# Patient Record
Sex: MALE | Race: WHITE | Employment: OTHER | URBAN - METROPOLITAN AREA
[De-identification: names, ages, dates, MRNs, and addresses within clinical notes are randomized per-mention and may not be internally consistent; named-entity substitution may affect disease eponyms.]

---

## 2019-01-01 ENCOUNTER — APPOINTMENT (OUTPATIENT)
Dept: CT IMAGING | Age: 84
DRG: 871 | End: 2019-01-01
Payer: MEDICARE

## 2019-01-01 ENCOUNTER — APPOINTMENT (OUTPATIENT)
Dept: GENERAL RADIOLOGY | Age: 84
DRG: 061 | End: 2019-01-01
Payer: MEDICARE

## 2019-01-01 ENCOUNTER — APPOINTMENT (OUTPATIENT)
Dept: ULTRASOUND IMAGING | Age: 84
DRG: 061 | End: 2019-01-01
Payer: MEDICARE

## 2019-01-01 ENCOUNTER — APPOINTMENT (OUTPATIENT)
Dept: GENERAL RADIOLOGY | Age: 84
DRG: 871 | End: 2019-01-01
Payer: MEDICARE

## 2019-01-01 ENCOUNTER — HOSPITAL ENCOUNTER (INPATIENT)
Age: 84
LOS: 2 days | Discharge: HOSPICE | DRG: 061 | End: 2019-07-17
Attending: EMERGENCY MEDICINE | Admitting: FAMILY MEDICINE
Payer: MEDICARE

## 2019-01-01 ENCOUNTER — TELEPHONE (OUTPATIENT)
Dept: CARDIOLOGY CLINIC | Age: 84
End: 2019-01-01

## 2019-01-01 ENCOUNTER — HOSPITAL ENCOUNTER (INPATIENT)
Age: 84
LOS: 8 days | Discharge: NURSING, REHAB OR LONG-TERM CARE | DRG: 871 | End: 2019-07-05
Attending: EMERGENCY MEDICINE | Admitting: INTERNAL MEDICINE
Payer: MEDICARE

## 2019-01-01 ENCOUNTER — APPOINTMENT (OUTPATIENT)
Dept: CT IMAGING | Age: 84
DRG: 061 | End: 2019-01-01
Payer: MEDICARE

## 2019-01-01 VITALS
HEART RATE: 60 BPM | HEIGHT: 72 IN | BODY MASS INDEX: 24.02 KG/M2 | DIASTOLIC BLOOD PRESSURE: 54 MMHG | SYSTOLIC BLOOD PRESSURE: 104 MMHG | TEMPERATURE: 97.4 F | WEIGHT: 177.31 LBS | OXYGEN SATURATION: 97 % | RESPIRATION RATE: 18 BRPM

## 2019-01-01 VITALS
TEMPERATURE: 98.4 F | OXYGEN SATURATION: 100 % | SYSTOLIC BLOOD PRESSURE: 105 MMHG | BODY MASS INDEX: 23.46 KG/M2 | HEART RATE: 77 BPM | DIASTOLIC BLOOD PRESSURE: 52 MMHG | RESPIRATION RATE: 20 BRPM | HEIGHT: 72 IN | WEIGHT: 173.2 LBS

## 2019-01-01 DIAGNOSIS — A41.9 SEPSIS, DUE TO UNSPECIFIED ORGANISM: Primary | ICD-10-CM

## 2019-01-01 DIAGNOSIS — I63.9 ISCHEMIC STROKE (HCC): Primary | ICD-10-CM

## 2019-01-01 DIAGNOSIS — N28.9 RENAL INSUFFICIENCY: ICD-10-CM

## 2019-01-01 DIAGNOSIS — J18.9 PNEUMONIA OF RIGHT LOWER LOBE DUE TO INFECTIOUS ORGANISM: ICD-10-CM

## 2019-01-01 DIAGNOSIS — I95.9 HYPOTENSION, UNSPECIFIED HYPOTENSION TYPE: ICD-10-CM

## 2019-01-01 DIAGNOSIS — Z51.5 PALLIATIVE CARE ENCOUNTER: ICD-10-CM

## 2019-01-01 DIAGNOSIS — R77.8 ELEVATED TROPONIN: ICD-10-CM

## 2019-01-01 DIAGNOSIS — E87.1 HYPONATREMIA: ICD-10-CM

## 2019-01-01 DIAGNOSIS — R53.1 GENERALIZED WEAKNESS: ICD-10-CM

## 2019-01-01 LAB
AADO2: 510.8 MMHG
ALBUMIN SERPL-MCNC: 2.9 G/DL (ref 3.5–5.2)
ALBUMIN SERPL-MCNC: 2.9 G/DL (ref 3.5–5.2)
ALBUMIN SERPL-MCNC: 3.3 G/DL (ref 3.5–5.2)
ALP BLD-CCNC: 165 U/L (ref 40–129)
ALP BLD-CCNC: 174 U/L (ref 40–129)
ALP BLD-CCNC: 194 U/L (ref 40–129)
ALT SERPL-CCNC: 28 U/L (ref 0–40)
ALT SERPL-CCNC: 32 U/L (ref 0–40)
ALT SERPL-CCNC: 39 U/L (ref 0–40)
ANION GAP SERPL CALCULATED.3IONS-SCNC: 10 MMOL/L (ref 7–16)
ANION GAP SERPL CALCULATED.3IONS-SCNC: 11 MMOL/L (ref 7–16)
ANION GAP SERPL CALCULATED.3IONS-SCNC: 12 MMOL/L (ref 7–16)
ANION GAP SERPL CALCULATED.3IONS-SCNC: 13 MMOL/L (ref 7–16)
ANION GAP SERPL CALCULATED.3IONS-SCNC: 14 MMOL/L (ref 7–16)
ANION GAP SERPL CALCULATED.3IONS-SCNC: 14 MMOL/L (ref 7–16)
ANION GAP SERPL CALCULATED.3IONS-SCNC: 18 MMOL/L (ref 7–16)
ANION GAP SERPL CALCULATED.3IONS-SCNC: 9 MMOL/L (ref 7–16)
ANION GAP SERPL CALCULATED.3IONS-SCNC: 9 MMOL/L (ref 7–16)
ANISOCYTOSIS: ABNORMAL
APTT: 27.6 SEC (ref 24.5–35.1)
APTT: 27.6 SEC (ref 24.5–35.1)
AST SERPL-CCNC: 36 U/L (ref 0–39)
AST SERPL-CCNC: 43 U/L (ref 0–39)
AST SERPL-CCNC: 56 U/L (ref 0–39)
B.E.: -6.8 MMOL/L (ref -3–3)
BACTERIA: NORMAL /HPF
BACTERIA: NORMAL /HPF
BASOPHILS ABSOLUTE: 0 E9/L (ref 0–0.2)
BASOPHILS ABSOLUTE: 0.02 E9/L (ref 0–0.2)
BASOPHILS ABSOLUTE: 0.05 E9/L (ref 0–0.2)
BASOPHILS ABSOLUTE: 0.08 E9/L (ref 0–0.2)
BASOPHILS ABSOLUTE: 0.09 E9/L (ref 0–0.2)
BASOPHILS RELATIVE PERCENT: 0 % (ref 0–2)
BASOPHILS RELATIVE PERCENT: 0 % (ref 0–2)
BASOPHILS RELATIVE PERCENT: 0.1 % (ref 0–2)
BASOPHILS RELATIVE PERCENT: 0.1 % (ref 0–2)
BASOPHILS RELATIVE PERCENT: 0.2 % (ref 0–2)
BASOPHILS RELATIVE PERCENT: 0.9 % (ref 0–2)
BASOPHILS RELATIVE PERCENT: 1 % (ref 0–2)
BASOPHILS RELATIVE PERCENT: 1.1 % (ref 0–2)
BILIRUB SERPL-MCNC: 0.7 MG/DL (ref 0–1.2)
BILIRUB SERPL-MCNC: 1.1 MG/DL (ref 0–1.2)
BILIRUB SERPL-MCNC: 1.1 MG/DL (ref 0–1.2)
BILIRUBIN URINE: NEGATIVE
BILIRUBIN URINE: NEGATIVE
BLOOD CULTURE, ROUTINE: NORMAL
BLOOD, URINE: ABNORMAL
BLOOD, URINE: ABNORMAL
BUN BLDV-MCNC: 23 MG/DL (ref 8–23)
BUN BLDV-MCNC: 24 MG/DL (ref 8–23)
BUN BLDV-MCNC: 24 MG/DL (ref 8–23)
BUN BLDV-MCNC: 25 MG/DL (ref 8–23)
BUN BLDV-MCNC: 31 MG/DL (ref 8–23)
BUN BLDV-MCNC: 36 MG/DL (ref 8–23)
BUN BLDV-MCNC: 38 MG/DL (ref 8–23)
BUN BLDV-MCNC: 39 MG/DL (ref 8–23)
BUN BLDV-MCNC: 41 MG/DL (ref 8–23)
BUN BLDV-MCNC: 43 MG/DL (ref 8–23)
BUN BLDV-MCNC: 45 MG/DL (ref 8–23)
BURR CELLS: ABNORMAL
CALCIUM SERPL-MCNC: 8.4 MG/DL (ref 8.6–10.2)
CALCIUM SERPL-MCNC: 8.5 MG/DL (ref 8.6–10.2)
CALCIUM SERPL-MCNC: 8.6 MG/DL (ref 8.6–10.2)
CALCIUM SERPL-MCNC: 8.7 MG/DL (ref 8.6–10.2)
CALCIUM SERPL-MCNC: 8.7 MG/DL (ref 8.6–10.2)
CALCIUM SERPL-MCNC: 8.8 MG/DL (ref 8.6–10.2)
CALCIUM SERPL-MCNC: 8.8 MG/DL (ref 8.6–10.2)
CALCIUM SERPL-MCNC: 9 MG/DL (ref 8.6–10.2)
CALCIUM SERPL-MCNC: 9.1 MG/DL (ref 8.6–10.2)
CALCIUM SERPL-MCNC: 9.3 MG/DL (ref 8.6–10.2)
CALCIUM SERPL-MCNC: 9.6 MG/DL (ref 8.6–10.2)
CHLORIDE BLD-SCNC: 100 MMOL/L (ref 98–107)
CHLORIDE BLD-SCNC: 102 MMOL/L (ref 98–107)
CHLORIDE BLD-SCNC: 102 MMOL/L (ref 98–107)
CHLORIDE BLD-SCNC: 91 MMOL/L (ref 98–107)
CHLORIDE BLD-SCNC: 91 MMOL/L (ref 98–107)
CHLORIDE BLD-SCNC: 96 MMOL/L (ref 98–107)
CHLORIDE BLD-SCNC: 96 MMOL/L (ref 98–107)
CHLORIDE BLD-SCNC: 97 MMOL/L (ref 98–107)
CHLORIDE BLD-SCNC: 98 MMOL/L (ref 98–107)
CHLORIDE BLD-SCNC: 99 MMOL/L (ref 98–107)
CHLORIDE BLD-SCNC: 99 MMOL/L (ref 98–107)
CHLORIDE URINE RANDOM: 23 MMOL/L
CHOLESTEROL, TOTAL: 103 MG/DL (ref 0–199)
CHP ED QC CHECK: NORMAL
CHP ED QC CHECK: YES
CLARITY: CLEAR
CLARITY: CLEAR
CO2: 19 MMOL/L (ref 22–29)
CO2: 20 MMOL/L (ref 22–29)
CO2: 20 MMOL/L (ref 22–29)
CO2: 21 MMOL/L (ref 22–29)
CO2: 22 MMOL/L (ref 22–29)
CO2: 23 MMOL/L (ref 22–29)
CO2: 24 MMOL/L (ref 22–29)
COHB: 0.4 % (ref 0–1.5)
COLOR: YELLOW
COLOR: YELLOW
CORTISOL TOTAL: 47.8 MCG/DL (ref 2.68–18.4)
CREAT SERPL-MCNC: 1.1 MG/DL (ref 0.7–1.2)
CREAT SERPL-MCNC: 1.1 MG/DL (ref 0.7–1.2)
CREAT SERPL-MCNC: 1.2 MG/DL (ref 0.7–1.2)
CREAT SERPL-MCNC: 1.2 MG/DL (ref 0.7–1.2)
CREAT SERPL-MCNC: 1.3 MG/DL (ref 0.7–1.2)
CREAT SERPL-MCNC: 1.4 MG/DL (ref 0.7–1.2)
CREAT SERPL-MCNC: 1.4 MG/DL (ref 0.7–1.2)
CREAT SERPL-MCNC: 1.5 MG/DL (ref 0.7–1.2)
CREAT SERPL-MCNC: 1.5 MG/DL (ref 0.7–1.2)
CREAT SERPL-MCNC: 1.8 MG/DL (ref 0.7–1.2)
CREAT SERPL-MCNC: 2.1 MG/DL (ref 0.7–1.2)
CRITICAL: ABNORMAL
CULTURE, BLOOD 2: NORMAL
DATE ANALYZED: ABNORMAL
DATE OF COLLECTION: ABNORMAL
EKG ATRIAL RATE: 115 BPM
EKG ATRIAL RATE: 83 BPM
EKG ATRIAL RATE: 97 BPM
EKG P AXIS: 26 DEGREES
EKG P AXIS: 81 DEGREES
EKG P-R INTERVAL: 156 MS
EKG P-R INTERVAL: 178 MS
EKG Q-T INTERVAL: 390 MS
EKG Q-T INTERVAL: 402 MS
EKG Q-T INTERVAL: 406 MS
EKG QRS DURATION: 130 MS
EKG QRS DURATION: 156 MS
EKG QRS DURATION: 158 MS
EKG QTC CALCULATION (BAZETT): 477 MS
EKG QTC CALCULATION (BAZETT): 510 MS
EKG QTC CALCULATION (BAZETT): 539 MS
EKG R AXIS: -56 DEGREES
EKG R AXIS: -59 DEGREES
EKG R AXIS: -90 DEGREES
EKG T AXIS: 67 DEGREES
EKG T AXIS: 91 DEGREES
EKG T AXIS: 98 DEGREES
EKG VENTRICULAR RATE: 115 BPM
EKG VENTRICULAR RATE: 83 BPM
EKG VENTRICULAR RATE: 97 BPM
EOSINOPHILS ABSOLUTE: 0.13 E9/L (ref 0.05–0.5)
EOSINOPHILS ABSOLUTE: 0.3 E9/L (ref 0.05–0.5)
EOSINOPHILS ABSOLUTE: 0.35 E9/L (ref 0.05–0.5)
EOSINOPHILS ABSOLUTE: 0.54 E9/L (ref 0.05–0.5)
EOSINOPHILS ABSOLUTE: 1.39 E9/L (ref 0.05–0.5)
EOSINOPHILS ABSOLUTE: 1.95 E9/L (ref 0.05–0.5)
EOSINOPHILS ABSOLUTE: 2.05 E9/L (ref 0.05–0.5)
EOSINOPHILS ABSOLUTE: 2.09 E9/L (ref 0.05–0.5)
EOSINOPHILS ABSOLUTE: 2.33 E9/L (ref 0.05–0.5)
EOSINOPHILS ABSOLUTE: 2.88 E9/L (ref 0.05–0.5)
EOSINOPHILS RELATIVE PERCENT: 0.9 % (ref 0–6)
EOSINOPHILS RELATIVE PERCENT: 1.8 % (ref 0–6)
EOSINOPHILS RELATIVE PERCENT: 14 % (ref 0–6)
EOSINOPHILS RELATIVE PERCENT: 16 % (ref 0–6)
EOSINOPHILS RELATIVE PERCENT: 2.4 % (ref 0–6)
EOSINOPHILS RELATIVE PERCENT: 2.6 % (ref 0–6)
EOSINOPHILS RELATIVE PERCENT: 21.2 % (ref 0–6)
EOSINOPHILS RELATIVE PERCENT: 22 % (ref 0–6)
EOSINOPHILS RELATIVE PERCENT: 22.3 % (ref 0–6)
EOSINOPHILS RELATIVE PERCENT: 31 % (ref 0–6)
FIO2: 100 %
GFR AFRICAN AMERICAN: 36
GFR AFRICAN AMERICAN: 43
GFR AFRICAN AMERICAN: 53
GFR AFRICAN AMERICAN: 53
GFR AFRICAN AMERICAN: 58
GFR AFRICAN AMERICAN: 58
GFR AFRICAN AMERICAN: >60
GFR NON-AFRICAN AMERICAN: 30 ML/MIN/1.73
GFR NON-AFRICAN AMERICAN: 36 ML/MIN/1.73
GFR NON-AFRICAN AMERICAN: 44 ML/MIN/1.73
GFR NON-AFRICAN AMERICAN: 44 ML/MIN/1.73
GFR NON-AFRICAN AMERICAN: 48 ML/MIN/1.73
GFR NON-AFRICAN AMERICAN: 48 ML/MIN/1.73
GFR NON-AFRICAN AMERICAN: 52 ML/MIN/1.73
GFR NON-AFRICAN AMERICAN: 57 ML/MIN/1.73
GFR NON-AFRICAN AMERICAN: 57 ML/MIN/1.73
GFR NON-AFRICAN AMERICAN: >60 ML/MIN/1.73
GFR NON-AFRICAN AMERICAN: >60 ML/MIN/1.73
GLUCOSE BLD-MCNC: 100 MG/DL (ref 74–99)
GLUCOSE BLD-MCNC: 100 MG/DL (ref 74–99)
GLUCOSE BLD-MCNC: 103 MG/DL
GLUCOSE BLD-MCNC: 120 MG/DL (ref 74–99)
GLUCOSE BLD-MCNC: 125 MG/DL (ref 74–99)
GLUCOSE BLD-MCNC: 125 MG/DL (ref 74–99)
GLUCOSE BLD-MCNC: 137 MG/DL
GLUCOSE BLD-MCNC: 146 MG/DL (ref 74–99)
GLUCOSE BLD-MCNC: 146 MG/DL (ref 74–99)
GLUCOSE BLD-MCNC: 157 MG/DL (ref 74–99)
GLUCOSE BLD-MCNC: 91 MG/DL (ref 74–99)
GLUCOSE BLD-MCNC: 91 MG/DL (ref 74–99)
GLUCOSE BLD-MCNC: 97 MG/DL (ref 74–99)
GLUCOSE URINE: NEGATIVE MG/DL
GLUCOSE URINE: NEGATIVE MG/DL
HBA1C MFR BLD: 6 % (ref 4–5.6)
HCO3: 17.2 MMOL/L (ref 22–26)
HCT VFR BLD CALC: 30.6 % (ref 37–54)
HCT VFR BLD CALC: 31 % (ref 37–54)
HCT VFR BLD CALC: 33.5 % (ref 37–54)
HCT VFR BLD CALC: 34 % (ref 37–54)
HCT VFR BLD CALC: 34.3 % (ref 37–54)
HCT VFR BLD CALC: 34.3 % (ref 37–54)
HCT VFR BLD CALC: 34.5 % (ref 37–54)
HCT VFR BLD CALC: 35.6 % (ref 37–54)
HCT VFR BLD CALC: 36.4 % (ref 37–54)
HCT VFR BLD CALC: 38 % (ref 37–54)
HDLC SERPL-MCNC: 45 MG/DL
HEMOGLOBIN: 10.4 G/DL (ref 12.5–16.5)
HEMOGLOBIN: 10.4 G/DL (ref 12.5–16.5)
HEMOGLOBIN: 11.3 G/DL (ref 12.5–16.5)
HEMOGLOBIN: 11.4 G/DL (ref 12.5–16.5)
HEMOGLOBIN: 11.5 G/DL (ref 12.5–16.5)
HEMOGLOBIN: 11.6 G/DL (ref 12.5–16.5)
HEMOGLOBIN: 11.8 G/DL (ref 12.5–16.5)
HEMOGLOBIN: 11.9 G/DL (ref 12.5–16.5)
HEMOGLOBIN: 12.2 G/DL (ref 12.5–16.5)
HEMOGLOBIN: 12.9 G/DL (ref 12.5–16.5)
HHB: 1.2 % (ref 0–5)
IMMATURE GRANULOCYTES #: 0.06 E9/L
IMMATURE GRANULOCYTES #: 1.01 E9/L
IMMATURE GRANULOCYTES %: 0.6 % (ref 0–5)
IMMATURE GRANULOCYTES %: 4.5 % (ref 0–5)
INR BLD: 1.1
INR BLD: 1.1
KETONES, URINE: NEGATIVE MG/DL
KETONES, URINE: NEGATIVE MG/DL
L. PNEUMOPHILA SEROGP 1 UR AG: NORMAL
LAB: ABNORMAL
LACTIC ACID: 2 MMOL/L (ref 0.5–2.2)
LACTIC ACID: 5 MMOL/L (ref 0.5–2.2)
LACTIC ACID: 6.8 MMOL/L (ref 0.5–2.2)
LDL CHOLESTEROL CALCULATED: 36 MG/DL (ref 0–99)
LEUKOCYTE ESTERASE, URINE: NEGATIVE
LEUKOCYTE ESTERASE, URINE: NEGATIVE
LV EF: 48 %
LVEF MODALITY: NORMAL
LYMPHOCYTES ABSOLUTE: 0 E9/L (ref 1.5–4)
LYMPHOCYTES ABSOLUTE: 0.12 E9/L (ref 1.5–4)
LYMPHOCYTES ABSOLUTE: 0.12 E9/L (ref 1.5–4)
LYMPHOCYTES ABSOLUTE: 0.28 E9/L (ref 1.5–4)
LYMPHOCYTES ABSOLUTE: 0.78 E9/L (ref 1.5–4)
LYMPHOCYTES ABSOLUTE: 1.12 E9/L (ref 1.5–4)
LYMPHOCYTES ABSOLUTE: 2.33 E9/L (ref 1.5–4)
LYMPHOCYTES ABSOLUTE: 2.67 E9/L (ref 1.5–4)
LYMPHOCYTES RELATIVE PERCENT: 0.4 % (ref 20–42)
LYMPHOCYTES RELATIVE PERCENT: 0.4 % (ref 20–42)
LYMPHOCYTES RELATIVE PERCENT: 0.5 % (ref 20–42)
LYMPHOCYTES RELATIVE PERCENT: 0.5 % (ref 20–42)
LYMPHOCYTES RELATIVE PERCENT: 1 % (ref 20–42)
LYMPHOCYTES RELATIVE PERCENT: 12 % (ref 20–42)
LYMPHOCYTES RELATIVE PERCENT: 2.7 % (ref 20–42)
LYMPHOCYTES RELATIVE PERCENT: 22 % (ref 20–42)
LYMPHOCYTES RELATIVE PERCENT: 27.2 % (ref 20–42)
LYMPHOCYTES RELATIVE PERCENT: 7.9 % (ref 20–42)
Lab: ABNORMAL
MAGNESIUM: 1.5 MG/DL (ref 1.6–2.6)
MAGNESIUM: 1.6 MG/DL (ref 1.6–2.6)
MCH RBC QN AUTO: 30.9 PG (ref 26–35)
MCH RBC QN AUTO: 31 PG (ref 26–35)
MCH RBC QN AUTO: 31.2 PG (ref 26–35)
MCH RBC QN AUTO: 31.5 PG (ref 26–35)
MCH RBC QN AUTO: 31.6 PG (ref 26–35)
MCH RBC QN AUTO: 31.7 PG (ref 26–35)
MCH RBC QN AUTO: 31.8 PG (ref 26–35)
MCH RBC QN AUTO: 31.9 PG (ref 26–35)
MCHC RBC AUTO-ENTMCNC: 32.9 % (ref 32–34.5)
MCHC RBC AUTO-ENTMCNC: 33.2 % (ref 32–34.5)
MCHC RBC AUTO-ENTMCNC: 33.4 % (ref 32–34.5)
MCHC RBC AUTO-ENTMCNC: 33.5 % (ref 32–34.5)
MCHC RBC AUTO-ENTMCNC: 33.5 % (ref 32–34.5)
MCHC RBC AUTO-ENTMCNC: 33.9 % (ref 32–34.5)
MCHC RBC AUTO-ENTMCNC: 34 % (ref 32–34.5)
MCHC RBC AUTO-ENTMCNC: 34.1 % (ref 32–34.5)
MCHC RBC AUTO-ENTMCNC: 34.2 % (ref 32–34.5)
MCHC RBC AUTO-ENTMCNC: 34.3 % (ref 32–34.5)
MCV RBC AUTO: 92.3 FL (ref 80–99.9)
MCV RBC AUTO: 92.5 FL (ref 80–99.9)
MCV RBC AUTO: 92.7 FL (ref 80–99.9)
MCV RBC AUTO: 92.7 FL (ref 80–99.9)
MCV RBC AUTO: 93.2 FL (ref 80–99.9)
MCV RBC AUTO: 93.2 FL (ref 80–99.9)
MCV RBC AUTO: 93.7 FL (ref 80–99.9)
MCV RBC AUTO: 93.8 FL (ref 80–99.9)
MCV RBC AUTO: 94.8 FL (ref 80–99.9)
MCV RBC AUTO: 95 FL (ref 80–99.9)
METAMYELOCYTES RELATIVE PERCENT: 1 % (ref 0–1)
METER GLUCOSE: 103 MG/DL (ref 74–99)
METER GLUCOSE: 137 MG/DL (ref 74–99)
METER GLUCOSE: 137 MG/DL (ref 74–99)
METHB: 0.5 % (ref 0–1.5)
MODE: ABNORMAL
MONOCYTES ABSOLUTE: 0.12 E9/L (ref 0.1–0.95)
MONOCYTES ABSOLUTE: 0.14 E9/L (ref 0.1–0.95)
MONOCYTES ABSOLUTE: 0.26 E9/L (ref 0.1–0.95)
MONOCYTES ABSOLUTE: 0.27 E9/L (ref 0.1–0.95)
MONOCYTES ABSOLUTE: 0.28 E9/L (ref 0.1–0.95)
MONOCYTES ABSOLUTE: 0.32 E9/L (ref 0.1–0.95)
MONOCYTES ABSOLUTE: 0.33 E9/L (ref 0.1–0.95)
MONOCYTES ABSOLUTE: 0.37 E9/L (ref 0.1–0.95)
MONOCYTES ABSOLUTE: 0.39 E9/L (ref 0.1–0.95)
MONOCYTES ABSOLUTE: 0.59 E9/L (ref 0.1–0.95)
MONOCYTES RELATIVE PERCENT: 0.9 % (ref 2–12)
MONOCYTES RELATIVE PERCENT: 1 % (ref 2–12)
MONOCYTES RELATIVE PERCENT: 1.2 % (ref 2–12)
MONOCYTES RELATIVE PERCENT: 1.7 % (ref 2–12)
MONOCYTES RELATIVE PERCENT: 1.8 % (ref 2–12)
MONOCYTES RELATIVE PERCENT: 3 % (ref 2–12)
MONOCYTES RELATIVE PERCENT: 3 % (ref 2–12)
MONOCYTES RELATIVE PERCENT: 3.6 % (ref 2–12)
MONOCYTES RELATIVE PERCENT: 4 % (ref 2–12)
MONOCYTES RELATIVE PERCENT: 6.1 % (ref 2–12)
MRSA CULTURE ONLY: NORMAL
MYELOCYTE PERCENT: 1 % (ref 0–0)
MYELOCYTE PERCENT: 1.8 % (ref 0–0)
NEUTROPHILS ABSOLUTE: 10 E9/L (ref 1.8–7.3)
NEUTROPHILS ABSOLUTE: 12.86 E9/L (ref 1.8–7.3)
NEUTROPHILS ABSOLUTE: 14.01 E9/L (ref 1.8–7.3)
NEUTROPHILS ABSOLUTE: 16.2 E9/L (ref 1.8–7.3)
NEUTROPHILS ABSOLUTE: 20.41 E9/L (ref 1.8–7.3)
NEUTROPHILS ABSOLUTE: 4.93 E9/L (ref 1.8–7.3)
NEUTROPHILS ABSOLUTE: 5.25 E9/L (ref 1.8–7.3)
NEUTROPHILS ABSOLUTE: 5.62 E9/L (ref 1.8–7.3)
NEUTROPHILS ABSOLUTE: 6.5 E9/L (ref 1.8–7.3)
NEUTROPHILS ABSOLUTE: 6.53 E9/L (ref 1.8–7.3)
NEUTROPHILS RELATIVE PERCENT: 51 % (ref 43–80)
NEUTROPHILS RELATIVE PERCENT: 52.6 % (ref 43–80)
NEUTROPHILS RELATIVE PERCENT: 53 % (ref 43–80)
NEUTROPHILS RELATIVE PERCENT: 66.1 % (ref 43–80)
NEUTROPHILS RELATIVE PERCENT: 68.8 % (ref 43–80)
NEUTROPHILS RELATIVE PERCENT: 82 % (ref 43–80)
NEUTROPHILS RELATIVE PERCENT: 91.2 % (ref 43–80)
NEUTROPHILS RELATIVE PERCENT: 95.7 % (ref 43–80)
NEUTROPHILS RELATIVE PERCENT: 96.5 % (ref 43–80)
NEUTROPHILS RELATIVE PERCENT: 98.2 % (ref 43–80)
NITRITE, URINE: NEGATIVE
NITRITE, URINE: NEGATIVE
O2 SATURATION: 98.8 % (ref 92–98.5)
O2HB: 97.9 % (ref 94–97)
OPERATOR ID: ABNORMAL
OSMOLALITY URINE: 512 MOSM/KG (ref 300–900)
OSMOLALITY: 278 MOSM/KG (ref 285–310)
OVALOCYTES: ABNORMAL
PATIENT TEMP: 37 C
PCO2: 30.3 MMHG (ref 35–45)
PDW BLD-RTO: 14.1 FL (ref 11.5–15)
PDW BLD-RTO: 14.1 FL (ref 11.5–15)
PDW BLD-RTO: 14.2 FL (ref 11.5–15)
PDW BLD-RTO: 14.3 FL (ref 11.5–15)
PDW BLD-RTO: 14.4 FL (ref 11.5–15)
PDW BLD-RTO: 14.6 FL (ref 11.5–15)
PDW BLD-RTO: 16.2 FL (ref 11.5–15)
PDW BLD-RTO: 16.2 FL (ref 11.5–15)
PEEP/CPAP: 6 CMH2O
PFO2: 1.47 MMHG/%
PH BLOOD GAS: 7.37 (ref 7.35–7.45)
PH UA: 6 (ref 5–9)
PH UA: 6 (ref 5–9)
PHOSPHORUS: 2.7 MG/DL (ref 2.5–4.5)
PIP: 20 CMH2O
PLATELET # BLD: 195 E9/L (ref 130–450)
PLATELET # BLD: 210 E9/L (ref 130–450)
PLATELET # BLD: 214 E9/L (ref 130–450)
PLATELET # BLD: 223 E9/L (ref 130–450)
PLATELET # BLD: 240 E9/L (ref 130–450)
PLATELET # BLD: 243 E9/L (ref 130–450)
PLATELET # BLD: 266 E9/L (ref 130–450)
PLATELET # BLD: 308 E9/L (ref 130–450)
PLATELET # BLD: 516 E9/L (ref 130–450)
PLATELET # BLD: 524 E9/L (ref 130–450)
PMV BLD AUTO: 10.1 FL (ref 7–12)
PMV BLD AUTO: 10.4 FL (ref 7–12)
PMV BLD AUTO: 10.5 FL (ref 7–12)
PMV BLD AUTO: 10.6 FL (ref 7–12)
PMV BLD AUTO: 10.7 FL (ref 7–12)
PMV BLD AUTO: 10.9 FL (ref 7–12)
PMV BLD AUTO: 10.9 FL (ref 7–12)
PMV BLD AUTO: 9.8 FL (ref 7–12)
PO2: 146.9 MMHG (ref 60–100)
POIKILOCYTES: ABNORMAL
POLYCHROMASIA: ABNORMAL
POTASSIUM REFLEX MAGNESIUM: 4.1 MMOL/L (ref 3.5–5)
POTASSIUM SERPL-SCNC: 4.1 MMOL/L (ref 3.5–5)
POTASSIUM SERPL-SCNC: 4.2 MMOL/L (ref 3.5–5)
POTASSIUM SERPL-SCNC: 4.3 MMOL/L (ref 3.5–5)
POTASSIUM SERPL-SCNC: 4.4 MMOL/L (ref 3.5–5)
POTASSIUM SERPL-SCNC: 4.5 MMOL/L (ref 3.5–5)
POTASSIUM SERPL-SCNC: 4.5 MMOL/L (ref 3.5–5)
POTASSIUM SERPL-SCNC: 4.7 MMOL/L (ref 3.5–5)
POTASSIUM SERPL-SCNC: 5.2 MMOL/L (ref 3.5–5)
POTASSIUM, UR: 24.3 MMOL/L
PRO-BNP: ABNORMAL PG/ML (ref 0–450)
PROCALCITONIN: 0.8 NG/ML (ref 0–0.08)
PROCALCITONIN: 23.04 NG/ML (ref 0–0.08)
PROCALCITONIN: 5.2 NG/ML (ref 0–0.08)
PROTEIN UA: ABNORMAL MG/DL
PROTEIN UA: NEGATIVE MG/DL
PROTHROMBIN TIME: 12.1 SEC (ref 9.3–12.4)
PROTHROMBIN TIME: 12.9 SEC (ref 9.3–12.4)
RBC # BLD: 3.3 E12/L (ref 3.8–5.8)
RBC # BLD: 3.36 E12/L (ref 3.8–5.8)
RBC # BLD: 3.61 E12/L (ref 3.8–5.8)
RBC # BLD: 3.62 E12/L (ref 3.8–5.8)
RBC # BLD: 3.65 E12/L (ref 3.8–5.8)
RBC # BLD: 3.66 E12/L (ref 3.8–5.8)
RBC # BLD: 3.72 E12/L (ref 3.8–5.8)
RBC # BLD: 3.82 E12/L (ref 3.8–5.8)
RBC # BLD: 3.84 E12/L (ref 3.8–5.8)
RBC # BLD: 4.05 E12/L (ref 3.8–5.8)
RBC # BLD: NORMAL 10*6/UL
RBC # BLD: NORMAL 10*6/UL
RBC UA: >20 /HPF (ref 0–2)
RBC UA: NORMAL /HPF (ref 0–2)
RI(T): 3.48
SODIUM BLD-SCNC: 124 MMOL/L (ref 132–146)
SODIUM BLD-SCNC: 126 MMOL/L (ref 132–146)
SODIUM BLD-SCNC: 128 MMOL/L (ref 132–146)
SODIUM BLD-SCNC: 129 MMOL/L (ref 132–146)
SODIUM BLD-SCNC: 130 MMOL/L (ref 132–146)
SODIUM BLD-SCNC: 131 MMOL/L (ref 132–146)
SODIUM BLD-SCNC: 131 MMOL/L (ref 132–146)
SODIUM BLD-SCNC: 133 MMOL/L (ref 132–146)
SODIUM BLD-SCNC: 133 MMOL/L (ref 132–146)
SODIUM BLD-SCNC: 134 MMOL/L (ref 132–146)
SODIUM BLD-SCNC: 135 MMOL/L (ref 132–146)
SODIUM URINE: <20 MMOL/L
SOURCE, BLOOD GAS: ABNORMAL
SPECIFIC GRAVITY UA: 1.01 (ref 1–1.03)
SPECIFIC GRAVITY UA: <=1.005 (ref 1–1.03)
SPHEROCYTES: ABNORMAL
STREP PNEUMONIAE ANTIGEN, URINE: NORMAL
THB: 13.7 G/DL (ref 11.5–16.5)
TIME ANALYZED: 2206
TOTAL PROTEIN: 6.1 G/DL (ref 6.4–8.3)
TOTAL PROTEIN: 7 G/DL (ref 6.4–8.3)
TOTAL PROTEIN: 7.1 G/DL (ref 6.4–8.3)
TRIGL SERPL-MCNC: 112 MG/DL (ref 0–149)
TROPONIN: 0.03 NG/ML (ref 0–0.03)
TROPONIN: 0.05 NG/ML (ref 0–0.03)
TROPONIN: 0.07 NG/ML (ref 0–0.03)
TROPONIN: 0.08 NG/ML (ref 0–0.03)
TROPONIN: 0.09 NG/ML (ref 0–0.03)
TROPONIN: 0.18 NG/ML (ref 0–0.03)
TROPONIN: 0.21 NG/ML (ref 0–0.03)
TSH SERPL DL<=0.05 MIU/L-ACNC: 2.68 UIU/ML (ref 0.27–4.2)
UROBILINOGEN, URINE: 0.2 E.U./DL
UROBILINOGEN, URINE: 0.2 E.U./DL
VLDLC SERPL CALC-MCNC: 22 MG/DL
WBC # BLD: 10.6 E9/L (ref 4.5–11.5)
WBC # BLD: 12.2 E9/L (ref 4.5–11.5)
WBC # BLD: 13.4 E9/L (ref 4.5–11.5)
WBC # BLD: 14.3 E9/L (ref 4.5–11.5)
WBC # BLD: 16.7 E9/L (ref 4.5–11.5)
WBC # BLD: 22.4 E9/L (ref 4.5–11.5)
WBC # BLD: 9.2 E9/L (ref 4.5–11.5)
WBC # BLD: 9.3 E9/L (ref 4.5–11.5)
WBC # BLD: 9.8 E9/L (ref 4.5–11.5)
WBC # BLD: 9.9 E9/L (ref 4.5–11.5)
WBC UA: NORMAL /HPF (ref 0–5)
WBC UA: NORMAL /HPF (ref 0–5)

## 2019-01-01 PROCEDURE — 6370000000 HC RX 637 (ALT 250 FOR IP): Performed by: INTERNAL MEDICINE

## 2019-01-01 PROCEDURE — 6360000002 HC RX W HCPCS: Performed by: STUDENT IN AN ORGANIZED HEALTH CARE EDUCATION/TRAINING PROGRAM

## 2019-01-01 PROCEDURE — 36415 COLL VENOUS BLD VENIPUNCTURE: CPT

## 2019-01-01 PROCEDURE — 6360000002 HC RX W HCPCS: Performed by: INTERNAL MEDICINE

## 2019-01-01 PROCEDURE — 94640 AIRWAY INHALATION TREATMENT: CPT

## 2019-01-01 PROCEDURE — APPSS60 APP SPLIT SHARED TIME 46-60 MINUTES: Performed by: CLINICAL NURSE SPECIALIST

## 2019-01-01 PROCEDURE — 96365 THER/PROPH/DIAG IV INF INIT: CPT

## 2019-01-01 PROCEDURE — 93010 ELECTROCARDIOGRAM REPORT: CPT | Performed by: INTERNAL MEDICINE

## 2019-01-01 PROCEDURE — 93970 EXTREMITY STUDY: CPT

## 2019-01-01 PROCEDURE — 83930 ASSAY OF BLOOD OSMOLALITY: CPT

## 2019-01-01 PROCEDURE — 70450 CT HEAD/BRAIN W/O DYE: CPT

## 2019-01-01 PROCEDURE — 2580000003 HC RX 258: Performed by: INTERNAL MEDICINE

## 2019-01-01 PROCEDURE — 97110 THERAPEUTIC EXERCISES: CPT

## 2019-01-01 PROCEDURE — 70498 CT ANGIOGRAPHY NECK: CPT

## 2019-01-01 PROCEDURE — 84300 ASSAY OF URINE SODIUM: CPT

## 2019-01-01 PROCEDURE — 6360000002 HC RX W HCPCS: Performed by: EMERGENCY MEDICINE

## 2019-01-01 PROCEDURE — 94660 CPAP INITIATION&MGMT: CPT

## 2019-01-01 PROCEDURE — 85025 COMPLETE CBC W/AUTO DIFF WBC: CPT

## 2019-01-01 PROCEDURE — 80053 COMPREHEN METABOLIC PANEL: CPT

## 2019-01-01 PROCEDURE — 1200000000 HC SEMI PRIVATE

## 2019-01-01 PROCEDURE — 84145 PROCALCITONIN (PCT): CPT

## 2019-01-01 PROCEDURE — 2580000003 HC RX 258: Performed by: FAMILY MEDICINE

## 2019-01-01 PROCEDURE — 82962 GLUCOSE BLOOD TEST: CPT

## 2019-01-01 PROCEDURE — 6360000002 HC RX W HCPCS: Performed by: FAMILY MEDICINE

## 2019-01-01 PROCEDURE — 84133 ASSAY OF URINE POTASSIUM: CPT

## 2019-01-01 PROCEDURE — 97165 OT EVAL LOW COMPLEX 30 MIN: CPT

## 2019-01-01 PROCEDURE — 6360000002 HC RX W HCPCS: Performed by: NURSE PRACTITIONER

## 2019-01-01 PROCEDURE — 99223 1ST HOSP IP/OBS HIGH 75: CPT | Performed by: INTERNAL MEDICINE

## 2019-01-01 PROCEDURE — 97116 GAIT TRAINING THERAPY: CPT

## 2019-01-01 PROCEDURE — 97530 THERAPEUTIC ACTIVITIES: CPT

## 2019-01-01 PROCEDURE — 81001 URINALYSIS AUTO W/SCOPE: CPT

## 2019-01-01 PROCEDURE — 83935 ASSAY OF URINE OSMOLALITY: CPT

## 2019-01-01 PROCEDURE — 87040 BLOOD CULTURE FOR BACTERIA: CPT

## 2019-01-01 PROCEDURE — 0042T CT BRAIN PERFUSION: CPT

## 2019-01-01 PROCEDURE — 84484 ASSAY OF TROPONIN QUANT: CPT

## 2019-01-01 PROCEDURE — 80048 BASIC METABOLIC PNL TOTAL CA: CPT

## 2019-01-01 PROCEDURE — 83880 ASSAY OF NATRIURETIC PEPTIDE: CPT

## 2019-01-01 PROCEDURE — 83605 ASSAY OF LACTIC ACID: CPT

## 2019-01-01 PROCEDURE — 99285 EMERGENCY DEPT VISIT HI MDM: CPT

## 2019-01-01 PROCEDURE — 99222 1ST HOSP IP/OBS MODERATE 55: CPT | Performed by: SURGERY

## 2019-01-01 PROCEDURE — 71046 X-RAY EXAM CHEST 2 VIEWS: CPT

## 2019-01-01 PROCEDURE — 2580000003 HC RX 258: Performed by: EMERGENCY MEDICINE

## 2019-01-01 PROCEDURE — 80061 LIPID PANEL: CPT

## 2019-01-01 PROCEDURE — 97530 THERAPEUTIC ACTIVITIES: CPT | Performed by: PHYSICAL THERAPIST

## 2019-01-01 PROCEDURE — 82533 TOTAL CORTISOL: CPT

## 2019-01-01 PROCEDURE — 93005 ELECTROCARDIOGRAM TRACING: CPT | Performed by: CLINICAL NURSE SPECIALIST

## 2019-01-01 PROCEDURE — 85730 THROMBOPLASTIN TIME PARTIAL: CPT

## 2019-01-01 PROCEDURE — 82805 BLOOD GASES W/O2 SATURATION: CPT

## 2019-01-01 PROCEDURE — 84443 ASSAY THYROID STIM HORMONE: CPT

## 2019-01-01 PROCEDURE — 97535 SELF CARE MNGMENT TRAINING: CPT

## 2019-01-01 PROCEDURE — 71045 X-RAY EXAM CHEST 1 VIEW: CPT

## 2019-01-01 PROCEDURE — 99232 SBSQ HOSP IP/OBS MODERATE 35: CPT | Performed by: INTERNAL MEDICINE

## 2019-01-01 PROCEDURE — 93005 ELECTROCARDIOGRAM TRACING: CPT | Performed by: EMERGENCY MEDICINE

## 2019-01-01 PROCEDURE — 2500000003 HC RX 250 WO HCPCS: Performed by: EMERGENCY MEDICINE

## 2019-01-01 PROCEDURE — 97162 PT EVAL MOD COMPLEX 30 MIN: CPT | Performed by: PHYSICAL THERAPIST

## 2019-01-01 PROCEDURE — 71250 CT THORAX DX C-: CPT

## 2019-01-01 PROCEDURE — 93306 TTE W/DOPPLER COMPLETE: CPT

## 2019-01-01 PROCEDURE — 84100 ASSAY OF PHOSPHORUS: CPT

## 2019-01-01 PROCEDURE — 99221 1ST HOSP IP/OBS SF/LOW 40: CPT | Performed by: EMERGENCY MEDICINE

## 2019-01-01 PROCEDURE — 83735 ASSAY OF MAGNESIUM: CPT

## 2019-01-01 PROCEDURE — 2700000000 HC OXYGEN THERAPY PER DAY

## 2019-01-01 PROCEDURE — 2000000000 HC ICU R&B

## 2019-01-01 PROCEDURE — 85610 PROTHROMBIN TIME: CPT

## 2019-01-01 PROCEDURE — 82436 ASSAY OF URINE CHLORIDE: CPT

## 2019-01-01 PROCEDURE — 3E03317 INTRODUCTION OF OTHER THROMBOLYTIC INTO PERIPHERAL VEIN, PERCUTANEOUS APPROACH: ICD-10-PCS | Performed by: PSYCHIATRY & NEUROLOGY

## 2019-01-01 PROCEDURE — 2580000003 HC RX 258: Performed by: NURSE PRACTITIONER

## 2019-01-01 PROCEDURE — 02HV33Z INSERTION OF INFUSION DEVICE INTO SUPERIOR VENA CAVA, PERCUTANEOUS APPROACH: ICD-10-PCS | Performed by: PSYCHIATRY & NEUROLOGY

## 2019-01-01 PROCEDURE — 87081 CULTURE SCREEN ONLY: CPT

## 2019-01-01 PROCEDURE — 2580000003 HC RX 258: Performed by: STUDENT IN AN ORGANIZED HEALTH CARE EDUCATION/TRAINING PROGRAM

## 2019-01-01 PROCEDURE — 6360000002 HC RX W HCPCS

## 2019-01-01 PROCEDURE — 96376 TX/PRO/DX INJ SAME DRUG ADON: CPT

## 2019-01-01 PROCEDURE — 36592 COLLECT BLOOD FROM PICC: CPT

## 2019-01-01 PROCEDURE — 70496 CT ANGIOGRAPHY HEAD: CPT

## 2019-01-01 PROCEDURE — 2500000003 HC RX 250 WO HCPCS: Performed by: NURSE PRACTITIONER

## 2019-01-01 PROCEDURE — 83036 HEMOGLOBIN GLYCOSYLATED A1C: CPT

## 2019-01-01 PROCEDURE — 6360000004 HC RX CONTRAST MEDICATION: Performed by: RADIOLOGY

## 2019-01-01 PROCEDURE — 94761 N-INVAS EAR/PLS OXIMETRY MLT: CPT

## 2019-01-01 PROCEDURE — 2580000003 HC RX 258: Performed by: SURGERY

## 2019-01-01 PROCEDURE — 87450 HC DIRECT STREP B ANTIGEN: CPT

## 2019-01-01 RX ORDER — METOPROLOL SUCCINATE 100 MG/1
100 TABLET, EXTENDED RELEASE ORAL DAILY
Status: ON HOLD | COMMUNITY
End: 2019-01-01 | Stop reason: HOSPADM

## 2019-01-01 RX ORDER — MORPHINE SULFATE 2 MG/ML
2 INJECTION, SOLUTION INTRAMUSCULAR; INTRAVENOUS
Status: DISCONTINUED | OUTPATIENT
Start: 2019-01-01 | End: 2019-01-01 | Stop reason: HOSPADM

## 2019-01-01 RX ORDER — ONDANSETRON 4 MG/1
4 TABLET, ORALLY DISINTEGRATING ORAL EVERY 8 HOURS PRN
Status: DISCONTINUED | OUTPATIENT
Start: 2019-01-01 | End: 2019-01-01 | Stop reason: HOSPADM

## 2019-01-01 RX ORDER — AZITHROMYCIN 250 MG/1
500 TABLET, FILM COATED ORAL DAILY
Status: DISCONTINUED | OUTPATIENT
Start: 2019-01-01 | End: 2019-01-01 | Stop reason: HOSPADM

## 2019-01-01 RX ORDER — MORPHINE SULFATE 2 MG/ML
2 INJECTION, SOLUTION INTRAMUSCULAR; INTRAVENOUS
Qty: 1 VIAL | Refills: 0
Start: 2019-01-01 | End: 2019-07-20

## 2019-01-01 RX ORDER — CLOPIDOGREL BISULFATE 75 MG/1
75 TABLET ORAL DAILY
Status: ON HOLD | COMMUNITY
End: 2019-01-01 | Stop reason: HOSPADM

## 2019-01-01 RX ORDER — 0.9 % SODIUM CHLORIDE 0.9 %
50 INTRAVENOUS SOLUTION INTRAVENOUS ONCE
Status: COMPLETED | OUTPATIENT
Start: 2019-01-01 | End: 2019-01-01

## 2019-01-01 RX ORDER — ATORVASTATIN CALCIUM 40 MG/1
40 TABLET, FILM COATED ORAL NIGHTLY
Status: DISCONTINUED | OUTPATIENT
Start: 2019-01-01 | End: 2019-01-01

## 2019-01-01 RX ORDER — METOPROLOL SUCCINATE 100 MG/1
100 TABLET, EXTENDED RELEASE ORAL DAILY
Status: DISCONTINUED | OUTPATIENT
Start: 2019-01-01 | End: 2019-01-01 | Stop reason: DRUGHIGH

## 2019-01-01 RX ORDER — ACETAMINOPHEN 325 MG/1
650 TABLET ORAL NIGHTLY
Status: ON HOLD | COMMUNITY
End: 2019-01-01 | Stop reason: HOSPADM

## 2019-01-01 RX ORDER — FINASTERIDE 5 MG/1
5 TABLET, FILM COATED ORAL DAILY
Status: ON HOLD | COMMUNITY
End: 2019-01-01 | Stop reason: HOSPADM

## 2019-01-01 RX ORDER — METOPROLOL SUCCINATE 25 MG/1
12.5 TABLET, EXTENDED RELEASE ORAL DAILY
Status: DISCONTINUED | OUTPATIENT
Start: 2019-01-01 | End: 2019-01-01 | Stop reason: HOSPADM

## 2019-01-01 RX ORDER — GLYCOPYRROLATE 0.2 MG/ML
0.1 INJECTION INTRAMUSCULAR; INTRAVENOUS ONCE
Status: COMPLETED | OUTPATIENT
Start: 2019-01-01 | End: 2019-01-01

## 2019-01-01 RX ORDER — SODIUM CHLORIDE 9 MG/ML
INJECTION, SOLUTION INTRAVENOUS CONTINUOUS
Status: DISCONTINUED | OUTPATIENT
Start: 2019-01-01 | End: 2019-01-01

## 2019-01-01 RX ORDER — DEXTROSE MONOHYDRATE 25 G/50ML
12.5 INJECTION, SOLUTION INTRAVENOUS
Status: ACTIVE | OUTPATIENT
Start: 2019-01-01 | End: 2019-01-01

## 2019-01-01 RX ORDER — IPRATROPIUM BROMIDE AND ALBUTEROL SULFATE 2.5; .5 MG/3ML; MG/3ML
1 SOLUTION RESPIRATORY (INHALATION)
Status: DISCONTINUED | OUTPATIENT
Start: 2019-01-01 | End: 2019-01-01 | Stop reason: HOSPADM

## 2019-01-01 RX ORDER — FUROSEMIDE 10 MG/ML
40 INJECTION INTRAMUSCULAR; INTRAVENOUS ONCE
Status: DISCONTINUED | OUTPATIENT
Start: 2019-01-01 | End: 2019-01-01

## 2019-01-01 RX ORDER — FUROSEMIDE 10 MG/ML
20 INJECTION INTRAMUSCULAR; INTRAVENOUS ONCE
Status: COMPLETED | OUTPATIENT
Start: 2019-01-01 | End: 2019-01-01

## 2019-01-01 RX ORDER — SODIUM CHLORIDE 0.9 % (FLUSH) 0.9 %
10 SYRINGE (ML) INJECTION PRN
Status: DISCONTINUED | OUTPATIENT
Start: 2019-01-01 | End: 2019-01-01 | Stop reason: HOSPADM

## 2019-01-01 RX ORDER — PSEUDOEPHEDRINE HCL 30 MG
100 TABLET ORAL 2 TIMES DAILY
Status: ON HOLD | DISCHARGE
Start: 2019-01-01 | End: 2019-01-01 | Stop reason: HOSPADM

## 2019-01-01 RX ORDER — IPRATROPIUM BROMIDE AND ALBUTEROL SULFATE 2.5; .5 MG/3ML; MG/3ML
3 SOLUTION RESPIRATORY (INHALATION)
Qty: 360 ML | Status: ON HOLD | DISCHARGE
Start: 2019-01-01 | End: 2019-01-01 | Stop reason: HOSPADM

## 2019-01-01 RX ORDER — ATORVASTATIN CALCIUM 20 MG/1
20 TABLET, FILM COATED ORAL DAILY
Status: DISCONTINUED | OUTPATIENT
Start: 2019-01-01 | End: 2019-01-01 | Stop reason: HOSPADM

## 2019-01-01 RX ORDER — ACETAMINOPHEN 325 MG/1
650 TABLET ORAL EVERY 4 HOURS PRN
Status: DISCONTINUED | OUTPATIENT
Start: 2019-01-01 | End: 2019-01-01 | Stop reason: HOSPADM

## 2019-01-01 RX ORDER — CETIRIZINE HYDROCHLORIDE 10 MG/1
10 TABLET ORAL DAILY
Status: ON HOLD | COMMUNITY
End: 2019-01-01 | Stop reason: HOSPADM

## 2019-01-01 RX ORDER — ATORVASTATIN CALCIUM 20 MG/1
20 TABLET, FILM COATED ORAL DAILY
Status: ON HOLD | COMMUNITY
End: 2019-01-01 | Stop reason: HOSPADM

## 2019-01-01 RX ORDER — SODIUM CHLORIDE 0.9 % (FLUSH) 0.9 %
10 SYRINGE (ML) INJECTION ONCE
Status: DISCONTINUED | OUTPATIENT
Start: 2019-01-01 | End: 2019-01-01

## 2019-01-01 RX ORDER — DOCUSATE SODIUM 100 MG/1
100 CAPSULE, LIQUID FILLED ORAL 2 TIMES DAILY
Status: DISCONTINUED | OUTPATIENT
Start: 2019-01-01 | End: 2019-01-01 | Stop reason: HOSPADM

## 2019-01-01 RX ORDER — POLYETHYLENE GLYCOL 3350 17 G/17G
17 POWDER, FOR SOLUTION ORAL DAILY
Qty: 527 G | Refills: 1 | Status: ON HOLD | OUTPATIENT
Start: 2019-01-01 | End: 2019-01-01 | Stop reason: HOSPADM

## 2019-01-01 RX ORDER — SODIUM CHLORIDE 0.9 % (FLUSH) 0.9 %
10 SYRINGE (ML) INJECTION EVERY 12 HOURS SCHEDULED
Status: DISCONTINUED | OUTPATIENT
Start: 2019-01-01 | End: 2019-01-01 | Stop reason: HOSPADM

## 2019-01-01 RX ORDER — MIDAZOLAM HYDROCHLORIDE 1 MG/ML
1 INJECTION INTRAMUSCULAR; INTRAVENOUS
Status: DISCONTINUED | OUTPATIENT
Start: 2019-01-01 | End: 2019-01-01 | Stop reason: HOSPADM

## 2019-01-01 RX ORDER — ONDANSETRON 2 MG/ML
4 INJECTION INTRAMUSCULAR; INTRAVENOUS EVERY 6 HOURS PRN
Status: DISCONTINUED | OUTPATIENT
Start: 2019-01-01 | End: 2019-01-01

## 2019-01-01 RX ORDER — CLOPIDOGREL BISULFATE 75 MG/1
75 TABLET ORAL DAILY
Status: DISCONTINUED | OUTPATIENT
Start: 2019-01-01 | End: 2019-01-01 | Stop reason: HOSPADM

## 2019-01-01 RX ORDER — POLYETHYLENE GLYCOL 3350 17 G/17G
17 POWDER, FOR SOLUTION ORAL DAILY
Status: DISCONTINUED | OUTPATIENT
Start: 2019-01-01 | End: 2019-01-01 | Stop reason: HOSPADM

## 2019-01-01 RX ORDER — IPRATROPIUM BROMIDE AND ALBUTEROL SULFATE 2.5; .5 MG/3ML; MG/3ML
1 SOLUTION RESPIRATORY (INHALATION) EVERY 4 HOURS PRN
Status: DISCONTINUED | OUTPATIENT
Start: 2019-01-01 | End: 2019-01-01

## 2019-01-01 RX ORDER — CETIRIZINE HYDROCHLORIDE 10 MG/1
10 TABLET ORAL DAILY
Status: DISCONTINUED | OUTPATIENT
Start: 2019-01-01 | End: 2019-01-01 | Stop reason: HOSPADM

## 2019-01-01 RX ORDER — METOPROLOL SUCCINATE 25 MG/1
12.5 TABLET, EXTENDED RELEASE ORAL DAILY
Qty: 30 TABLET | Refills: 3 | Status: ON HOLD | OUTPATIENT
Start: 2019-01-01 | End: 2019-01-01 | Stop reason: HOSPADM

## 2019-01-01 RX ORDER — SODIUM CHLORIDE 0.9 % (FLUSH) 0.9 %
10 SYRINGE (ML) INJECTION EVERY 12 HOURS SCHEDULED
Status: DISCONTINUED | OUTPATIENT
Start: 2019-01-01 | End: 2019-01-01

## 2019-01-01 RX ORDER — FUROSEMIDE 10 MG/ML
INJECTION INTRAMUSCULAR; INTRAVENOUS
Status: COMPLETED
Start: 2019-01-01 | End: 2019-01-01

## 2019-01-01 RX ADMIN — MAGNESIUM OXIDE TAB 400 MG (241.3 MG ELEMENTAL MG) 400 MG: 400 (241.3 MG) TAB at 09:11

## 2019-01-01 RX ADMIN — IPRATROPIUM BROMIDE AND ALBUTEROL SULFATE 1 AMPULE: .5; 3 SOLUTION RESPIRATORY (INHALATION) at 09:35

## 2019-01-01 RX ADMIN — AZITHROMYCIN DIHYDRATE 500 MG: 500 INJECTION, POWDER, LYOPHILIZED, FOR SOLUTION INTRAVENOUS at 04:10

## 2019-01-01 RX ADMIN — CLOPIDOGREL BISULFATE 75 MG: 75 TABLET ORAL at 09:20

## 2019-01-01 RX ADMIN — FUROSEMIDE 20 MG: 10 INJECTION, SOLUTION INTRAMUSCULAR; INTRAVENOUS at 04:19

## 2019-01-01 RX ADMIN — CETIRIZINE HCL 10 MG: 10 TABLET ORAL at 09:43

## 2019-01-01 RX ADMIN — ENOXAPARIN SODIUM 30 MG: 30 INJECTION SUBCUTANEOUS at 09:12

## 2019-01-01 RX ADMIN — CETIRIZINE HCL 10 MG: 10 TABLET ORAL at 09:22

## 2019-01-01 RX ADMIN — MAGNESIUM OXIDE TAB 400 MG (241.3 MG ELEMENTAL MG) 400 MG: 400 (241.3 MG) TAB at 09:43

## 2019-01-01 RX ADMIN — IPRATROPIUM BROMIDE AND ALBUTEROL SULFATE 1 AMPULE: .5; 3 SOLUTION RESPIRATORY (INHALATION) at 09:48

## 2019-01-01 RX ADMIN — ENOXAPARIN SODIUM 40 MG: 40 INJECTION SUBCUTANEOUS at 08:51

## 2019-01-01 RX ADMIN — IPRATROPIUM BROMIDE AND ALBUTEROL SULFATE 1 AMPULE: .5; 3 SOLUTION RESPIRATORY (INHALATION) at 18:14

## 2019-01-01 RX ADMIN — MAGNESIUM OXIDE TAB 400 MG (241.3 MG ELEMENTAL MG) 400 MG: 400 (241.3 MG) TAB at 08:14

## 2019-01-01 RX ADMIN — SODIUM CHLORIDE: 9 INJECTION, SOLUTION INTRAVENOUS at 01:12

## 2019-01-01 RX ADMIN — ENOXAPARIN SODIUM 40 MG: 40 INJECTION SUBCUTANEOUS at 09:58

## 2019-01-01 RX ADMIN — ENOXAPARIN SODIUM 40 MG: 100 INJECTION SUBCUTANEOUS at 10:06

## 2019-01-01 RX ADMIN — CETIRIZINE HCL 10 MG: 10 TABLET ORAL at 08:51

## 2019-01-01 RX ADMIN — IPRATROPIUM BROMIDE AND ALBUTEROL SULFATE 1 AMPULE: .5; 3 SOLUTION RESPIRATORY (INHALATION) at 13:40

## 2019-01-01 RX ADMIN — DOCUSATE SODIUM 100 MG: 100 CAPSULE, LIQUID FILLED ORAL at 20:22

## 2019-01-01 RX ADMIN — Medication 10 ML: at 20:27

## 2019-01-01 RX ADMIN — METOPROLOL SUCCINATE 12.5 MG: 25 TABLET, EXTENDED RELEASE ORAL at 10:32

## 2019-01-01 RX ADMIN — MAGNESIUM OXIDE TAB 400 MG (241.3 MG ELEMENTAL MG) 400 MG: 400 (241.3 MG) TAB at 09:22

## 2019-01-01 RX ADMIN — ATORVASTATIN CALCIUM 20 MG: 20 TABLET, FILM COATED ORAL at 20:22

## 2019-01-01 RX ADMIN — DOCUSATE SODIUM 100 MG: 100 CAPSULE, LIQUID FILLED ORAL at 21:52

## 2019-01-01 RX ADMIN — Medication 10 ML: at 09:18

## 2019-01-01 RX ADMIN — FUROSEMIDE 20 MG: 10 INJECTION, SOLUTION INTRAVENOUS at 18:27

## 2019-01-01 RX ADMIN — MORPHINE SULFATE 1 MG/HR: 25 INJECTION, SOLUTION INTRAVENOUS at 13:24

## 2019-01-01 RX ADMIN — DOCUSATE SODIUM 100 MG: 100 CAPSULE, LIQUID FILLED ORAL at 20:46

## 2019-01-01 RX ADMIN — MAGNESIUM OXIDE TAB 400 MG (241.3 MG ELEMENTAL MG) 400 MG: 400 (241.3 MG) TAB at 10:06

## 2019-01-01 RX ADMIN — ATORVASTATIN CALCIUM 20 MG: 20 TABLET, FILM COATED ORAL at 21:52

## 2019-01-01 RX ADMIN — IPRATROPIUM BROMIDE AND ALBUTEROL SULFATE 1 AMPULE: .5; 3 SOLUTION RESPIRATORY (INHALATION) at 06:00

## 2019-01-01 RX ADMIN — ATORVASTATIN CALCIUM 20 MG: 20 TABLET, FILM COATED ORAL at 20:55

## 2019-01-01 RX ADMIN — IPRATROPIUM BROMIDE AND ALBUTEROL SULFATE 1 AMPULE: .5; 3 SOLUTION RESPIRATORY (INHALATION) at 16:39

## 2019-01-01 RX ADMIN — IPRATROPIUM BROMIDE AND ALBUTEROL SULFATE 1 AMPULE: .5; 3 SOLUTION RESPIRATORY (INHALATION) at 09:23

## 2019-01-01 RX ADMIN — IPRATROPIUM BROMIDE AND ALBUTEROL SULFATE 1 AMPULE: .5; 3 SOLUTION RESPIRATORY (INHALATION) at 09:12

## 2019-01-01 RX ADMIN — Medication 10 ML: at 21:02

## 2019-01-01 RX ADMIN — ATORVASTATIN CALCIUM 20 MG: 20 TABLET, FILM COATED ORAL at 21:03

## 2019-01-01 RX ADMIN — AZITHROMYCIN 500 MG: 250 TABLET, FILM COATED ORAL at 09:11

## 2019-01-01 RX ADMIN — CLOPIDOGREL BISULFATE 75 MG: 75 TABLET ORAL at 08:51

## 2019-01-01 RX ADMIN — METOPROLOL SUCCINATE 12.5 MG: 25 TABLET, EXTENDED RELEASE ORAL at 09:22

## 2019-01-01 RX ADMIN — CLOPIDOGREL BISULFATE 75 MG: 75 TABLET ORAL at 15:44

## 2019-01-01 RX ADMIN — WATER 2 G: 1 INJECTION INTRAMUSCULAR; INTRAVENOUS; SUBCUTANEOUS at 04:00

## 2019-01-01 RX ADMIN — CLOPIDOGREL BISULFATE 75 MG: 75 TABLET ORAL at 09:44

## 2019-01-01 RX ADMIN — IPRATROPIUM BROMIDE AND ALBUTEROL SULFATE 1 AMPULE: .5; 3 SOLUTION RESPIRATORY (INHALATION) at 09:20

## 2019-01-01 RX ADMIN — HYDROCORTISONE SODIUM SUCCINATE 50 MG: 100 INJECTION, POWDER, FOR SOLUTION INTRAMUSCULAR; INTRAVENOUS at 23:50

## 2019-01-01 RX ADMIN — ALTEPLASE 7.3 MG: KIT at 17:19

## 2019-01-01 RX ADMIN — Medication 10 ML: at 21:17

## 2019-01-01 RX ADMIN — DOCUSATE SODIUM 100 MG: 100 CAPSULE, LIQUID FILLED ORAL at 09:11

## 2019-01-01 RX ADMIN — Medication 10 ML: at 09:58

## 2019-01-01 RX ADMIN — IPRATROPIUM BROMIDE AND ALBUTEROL SULFATE 1 AMPULE: .5; 3 SOLUTION RESPIRATORY (INHALATION) at 06:43

## 2019-01-01 RX ADMIN — Medication 10 ML: at 09:12

## 2019-01-01 RX ADMIN — CLOPIDOGREL BISULFATE 75 MG: 75 TABLET ORAL at 09:11

## 2019-01-01 RX ADMIN — METOPROLOL SUCCINATE 12.5 MG: 25 TABLET, EXTENDED RELEASE ORAL at 09:11

## 2019-01-01 RX ADMIN — AZITHROMYCIN 500 MG: 250 TABLET, FILM COATED ORAL at 09:44

## 2019-01-01 RX ADMIN — IPRATROPIUM BROMIDE AND ALBUTEROL SULFATE 1 AMPULE: .5; 3 SOLUTION RESPIRATORY (INHALATION) at 05:41

## 2019-01-01 RX ADMIN — SODIUM CHLORIDE: 9 INJECTION, SOLUTION INTRAVENOUS at 23:39

## 2019-01-01 RX ADMIN — MAGNESIUM OXIDE TAB 400 MG (241.3 MG ELEMENTAL MG) 400 MG: 400 (241.3 MG) TAB at 11:02

## 2019-01-01 RX ADMIN — MAGNESIUM OXIDE TAB 400 MG (241.3 MG ELEMENTAL MG) 400 MG: 400 (241.3 MG) TAB at 09:20

## 2019-01-01 RX ADMIN — MAGNESIUM OXIDE TAB 400 MG (241.3 MG ELEMENTAL MG) 400 MG: 400 (241.3 MG) TAB at 09:45

## 2019-01-01 RX ADMIN — POLYETHYLENE GLYCOL 3350 17 G: 17 POWDER, FOR SOLUTION ORAL at 09:12

## 2019-01-01 RX ADMIN — AZITHROMYCIN 500 MG: 250 TABLET, FILM COATED ORAL at 09:46

## 2019-01-01 RX ADMIN — AZITHROMYCIN 500 MG: 250 TABLET, FILM COATED ORAL at 09:22

## 2019-01-01 RX ADMIN — Medication 10 ML: at 20:56

## 2019-01-01 RX ADMIN — CETIRIZINE HCL 10 MG: 10 TABLET ORAL at 09:48

## 2019-01-01 RX ADMIN — METOPROLOL SUCCINATE 12.5 MG: 25 TABLET, EXTENDED RELEASE ORAL at 08:14

## 2019-01-01 RX ADMIN — ENOXAPARIN SODIUM 30 MG: 30 INJECTION SUBCUTANEOUS at 09:22

## 2019-01-01 RX ADMIN — POLYETHYLENE GLYCOL 3350 17 G: 17 POWDER, FOR SOLUTION ORAL at 09:21

## 2019-01-01 RX ADMIN — CLOPIDOGREL BISULFATE 75 MG: 75 TABLET ORAL at 10:06

## 2019-01-01 RX ADMIN — Medication 10 ML: at 09:50

## 2019-01-01 RX ADMIN — SODIUM CHLORIDE 1 G: 9 INJECTION INTRAMUSCULAR; INTRAVENOUS; SUBCUTANEOUS at 04:35

## 2019-01-01 RX ADMIN — CETIRIZINE HCL 10 MG: 10 TABLET ORAL at 08:14

## 2019-01-01 RX ADMIN — IPRATROPIUM BROMIDE AND ALBUTEROL SULFATE 1 AMPULE: .5; 3 SOLUTION RESPIRATORY (INHALATION) at 13:06

## 2019-01-01 RX ADMIN — Medication 10 ML: at 08:14

## 2019-01-01 RX ADMIN — PIPERACILLIN, TAZOBACTAM 4.5 G: 4; .5 INJECTION, POWDER, LYOPHILIZED, FOR SOLUTION INTRAVENOUS at 19:18

## 2019-01-01 RX ADMIN — METOPROLOL SUCCINATE 12.5 MG: 25 TABLET, EXTENDED RELEASE ORAL at 10:05

## 2019-01-01 RX ADMIN — Medication 10 ML: at 08:19

## 2019-01-01 RX ADMIN — ENOXAPARIN SODIUM 40 MG: 100 INJECTION SUBCUTANEOUS at 11:08

## 2019-01-01 RX ADMIN — IPRATROPIUM BROMIDE AND ALBUTEROL SULFATE 1 AMPULE: .5; 3 SOLUTION RESPIRATORY (INHALATION) at 13:44

## 2019-01-01 RX ADMIN — IPRATROPIUM BROMIDE AND ALBUTEROL SULFATE 1 AMPULE: .5; 3 SOLUTION RESPIRATORY (INHALATION) at 13:00

## 2019-01-01 RX ADMIN — AZITHROMYCIN 500 MG: 250 TABLET, FILM COATED ORAL at 10:05

## 2019-01-01 RX ADMIN — IPRATROPIUM BROMIDE AND ALBUTEROL SULFATE 1 AMPULE: .5; 3 SOLUTION RESPIRATORY (INHALATION) at 17:23

## 2019-01-01 RX ADMIN — CETIRIZINE HCL 10 MG: 10 TABLET ORAL at 15:44

## 2019-01-01 RX ADMIN — IPRATROPIUM BROMIDE AND ALBUTEROL SULFATE 1 AMPULE: .5; 3 SOLUTION RESPIRATORY (INHALATION) at 06:10

## 2019-01-01 RX ADMIN — DOCUSATE SODIUM 100 MG: 100 CAPSULE, LIQUID FILLED ORAL at 21:16

## 2019-01-01 RX ADMIN — AZITHROMYCIN 500 MG: 250 TABLET, FILM COATED ORAL at 08:51

## 2019-01-01 RX ADMIN — POLYETHYLENE GLYCOL 3350 17 G: 17 POWDER, FOR SOLUTION ORAL at 09:43

## 2019-01-01 RX ADMIN — CLOPIDOGREL BISULFATE 75 MG: 75 TABLET ORAL at 09:22

## 2019-01-01 RX ADMIN — IPRATROPIUM BROMIDE AND ALBUTEROL SULFATE 1 AMPULE: .5; 3 SOLUTION RESPIRATORY (INHALATION) at 13:42

## 2019-01-01 RX ADMIN — IPRATROPIUM BROMIDE AND ALBUTEROL SULFATE 1 AMPULE: .5; 3 SOLUTION RESPIRATORY (INHALATION) at 10:13

## 2019-01-01 RX ADMIN — IPRATROPIUM BROMIDE AND ALBUTEROL SULFATE 1 AMPULE: .5; 3 SOLUTION RESPIRATORY (INHALATION) at 17:10

## 2019-01-01 RX ADMIN — ATORVASTATIN CALCIUM 20 MG: 20 TABLET, FILM COATED ORAL at 21:16

## 2019-01-01 RX ADMIN — DOCUSATE SODIUM 100 MG: 100 CAPSULE, LIQUID FILLED ORAL at 21:03

## 2019-01-01 RX ADMIN — GLYCOPYRROLATE 0.1 MG: 0.2 INJECTION, SOLUTION INTRAMUSCULAR; INTRAVENOUS at 13:59

## 2019-01-01 RX ADMIN — Medication 10 ML: at 21:24

## 2019-01-01 RX ADMIN — DOCUSATE SODIUM 100 MG: 100 CAPSULE, LIQUID FILLED ORAL at 08:51

## 2019-01-01 RX ADMIN — ENOXAPARIN SODIUM 30 MG: 30 INJECTION SUBCUTANEOUS at 09:48

## 2019-01-01 RX ADMIN — DOCUSATE SODIUM 100 MG: 100 CAPSULE, LIQUID FILLED ORAL at 10:06

## 2019-01-01 RX ADMIN — AZITHROMYCIN 500 MG: 250 TABLET, FILM COATED ORAL at 09:20

## 2019-01-01 RX ADMIN — ATORVASTATIN CALCIUM 20 MG: 20 TABLET, FILM COATED ORAL at 20:16

## 2019-01-01 RX ADMIN — CETIRIZINE HCL 10 MG: 10 TABLET ORAL at 09:12

## 2019-01-01 RX ADMIN — IPRATROPIUM BROMIDE AND ALBUTEROL SULFATE 1 AMPULE: .5; 3 SOLUTION RESPIRATORY (INHALATION) at 06:35

## 2019-01-01 RX ADMIN — CLOPIDOGREL BISULFATE 75 MG: 75 TABLET ORAL at 08:14

## 2019-01-01 RX ADMIN — ALTEPLASE 65.4 MG: KIT at 17:20

## 2019-01-01 RX ADMIN — Medication 10 ML: at 20:17

## 2019-01-01 RX ADMIN — METOPROLOL SUCCINATE 12.5 MG: 25 TABLET, EXTENDED RELEASE ORAL at 09:45

## 2019-01-01 RX ADMIN — DOCUSATE SODIUM 100 MG: 100 CAPSULE, LIQUID FILLED ORAL at 09:44

## 2019-01-01 RX ADMIN — DOCUSATE SODIUM 100 MG: 100 CAPSULE, LIQUID FILLED ORAL at 21:23

## 2019-01-01 RX ADMIN — AZITHROMYCIN 500 MG: 250 TABLET, FILM COATED ORAL at 08:14

## 2019-01-01 RX ADMIN — DOCUSATE SODIUM 100 MG: 100 CAPSULE, LIQUID FILLED ORAL at 20:16

## 2019-01-01 RX ADMIN — Medication 10 ML: at 21:52

## 2019-01-01 RX ADMIN — NOREPINEPHRINE BITARTRATE 2 MCG/MIN: 1 INJECTION INTRAVENOUS at 17:42

## 2019-01-01 RX ADMIN — CLOPIDOGREL BISULFATE 75 MG: 75 TABLET ORAL at 09:45

## 2019-01-01 RX ADMIN — ATORVASTATIN CALCIUM 20 MG: 20 TABLET, FILM COATED ORAL at 20:46

## 2019-01-01 RX ADMIN — SODIUM CHLORIDE: 9 INJECTION, SOLUTION INTRAVENOUS at 08:19

## 2019-01-01 RX ADMIN — DOCUSATE SODIUM 100 MG: 100 CAPSULE, LIQUID FILLED ORAL at 09:20

## 2019-01-01 RX ADMIN — PIPERACILLIN SODIUM,TAZOBACTAM SODIUM 3.38 G: 3; .375 INJECTION, POWDER, FOR SOLUTION INTRAVENOUS at 01:12

## 2019-01-01 RX ADMIN — IPRATROPIUM BROMIDE AND ALBUTEROL SULFATE 1 AMPULE: .5; 3 SOLUTION RESPIRATORY (INHALATION) at 13:39

## 2019-01-01 RX ADMIN — ENOXAPARIN SODIUM 40 MG: 100 INJECTION SUBCUTANEOUS at 09:43

## 2019-01-01 RX ADMIN — POLYETHYLENE GLYCOL 3350 17 G: 17 POWDER, FOR SOLUTION ORAL at 15:47

## 2019-01-01 RX ADMIN — IPRATROPIUM BROMIDE AND ALBUTEROL SULFATE 1 AMPULE: .5; 3 SOLUTION RESPIRATORY (INHALATION) at 13:30

## 2019-01-01 RX ADMIN — IPRATROPIUM BROMIDE AND ALBUTEROL SULFATE 1 AMPULE: .5; 3 SOLUTION RESPIRATORY (INHALATION) at 19:17

## 2019-01-01 RX ADMIN — IPRATROPIUM BROMIDE AND ALBUTEROL SULFATE 1 AMPULE: .5; 3 SOLUTION RESPIRATORY (INHALATION) at 09:51

## 2019-01-01 RX ADMIN — ATORVASTATIN CALCIUM 20 MG: 20 TABLET, FILM COATED ORAL at 21:23

## 2019-01-01 RX ADMIN — ENOXAPARIN SODIUM 30 MG: 30 INJECTION SUBCUTANEOUS at 08:14

## 2019-01-01 RX ADMIN — METOPROLOL SUCCINATE 12.5 MG: 25 TABLET, EXTENDED RELEASE ORAL at 09:21

## 2019-01-01 RX ADMIN — SODIUM CHLORIDE 50 ML: 9 INJECTION, SOLUTION INTRAVENOUS at 18:20

## 2019-01-01 RX ADMIN — DOCUSATE SODIUM 100 MG: 100 CAPSULE, LIQUID FILLED ORAL at 09:45

## 2019-01-01 RX ADMIN — CETIRIZINE HCL 10 MG: 10 TABLET ORAL at 10:05

## 2019-01-01 RX ADMIN — IPRATROPIUM BROMIDE AND ALBUTEROL SULFATE 1 AMPULE: .5; 3 SOLUTION RESPIRATORY (INHALATION) at 06:21

## 2019-01-01 RX ADMIN — SODIUM CHLORIDE 1 G: 9 INJECTION INTRAMUSCULAR; INTRAVENOUS; SUBCUTANEOUS at 04:30

## 2019-01-01 RX ADMIN — VANCOMYCIN HYDROCHLORIDE 1.5 G: 10 INJECTION, POWDER, LYOPHILIZED, FOR SOLUTION INTRAVENOUS at 19:59

## 2019-01-01 RX ADMIN — METOPROLOL SUCCINATE 12.5 MG: 25 TABLET, EXTENDED RELEASE ORAL at 09:48

## 2019-01-01 RX ADMIN — Medication 10 ML: at 09:23

## 2019-01-01 RX ADMIN — POLYETHYLENE GLYCOL 3350 17 G: 17 POWDER, FOR SOLUTION ORAL at 08:51

## 2019-01-01 RX ADMIN — IOPAMIDOL 100 ML: 755 INJECTION, SOLUTION INTRAVENOUS at 17:30

## 2019-01-01 RX ADMIN — IPRATROPIUM BROMIDE AND ALBUTEROL SULFATE 1 AMPULE: .5; 3 SOLUTION RESPIRATORY (INHALATION) at 14:09

## 2019-01-01 RX ADMIN — IPRATROPIUM BROMIDE AND ALBUTEROL SULFATE 1 AMPULE: .5; 3 SOLUTION RESPIRATORY (INHALATION) at 14:22

## 2019-01-01 RX ADMIN — IPRATROPIUM BROMIDE AND ALBUTEROL SULFATE 1 AMPULE: .5; 3 SOLUTION RESPIRATORY (INHALATION) at 06:22

## 2019-01-01 RX ADMIN — IPRATROPIUM BROMIDE AND ALBUTEROL SULFATE 1 AMPULE: .5; 3 SOLUTION RESPIRATORY (INHALATION) at 18:33

## 2019-01-01 RX ADMIN — POLYETHYLENE GLYCOL 3350 17 G: 17 POWDER, FOR SOLUTION ORAL at 10:05

## 2019-01-01 RX ADMIN — CETIRIZINE HCL 10 MG: 10 TABLET ORAL at 09:21

## 2019-01-01 RX ADMIN — POLYETHYLENE GLYCOL 3350 17 G: 17 POWDER, FOR SOLUTION ORAL at 09:46

## 2019-01-01 SDOH — HEALTH STABILITY: MENTAL HEALTH: HOW OFTEN DO YOU HAVE A DRINK CONTAINING ALCOHOL?: NEVER

## 2019-01-01 ASSESSMENT — PAIN SCALES - GENERAL
PAINLEVEL_OUTOF10: 0

## 2019-01-01 ASSESSMENT — ENCOUNTER SYMPTOMS
EYE REDNESS: 0
VOMITING: 0
SORE THROAT: 0
NAUSEA: 0
COUGH: 0
SHORTNESS OF BREATH: 0
SINUS PRESSURE: 0
DIARRHEA: 0
ABDOMINAL PAIN: 0
BACK PAIN: 0
EYE DISCHARGE: 0
WHEEZING: 0
EYE PAIN: 0

## 2019-06-27 PROBLEM — J16.0 CAP (COMMUNITY ACQUIRED PNEUMONIA) DUE TO CHLAMYDIA SPECIES: Status: ACTIVE | Noted: 2019-01-01

## 2019-06-27 PROBLEM — A41.9 SEPSIS (HCC): Status: ACTIVE | Noted: 2019-01-01

## 2019-06-27 NOTE — ED PROVIDER NOTES
Musculoskeletal: Normal range of motion. He exhibits no edema. Neurological: He is alert and oriented to person, place, and time. He has normal strength. No sensory deficit. Skin: Skin is warm and dry. No rash noted. He is not diaphoretic. Nursing note and vitals reviewed. Procedures    [facility]              --------------------------------------------- PAST HISTORY ---------------------------------------------  Past Medical History:  has a past medical history of CAD (coronary artery disease) and Hyperlipidemia. Past Surgical History:  has a past surgical history that includes Ventricular cardiac pacer insertion (2017). Social History:  reports that he has never smoked. He has never used smokeless tobacco. He reports that he does not drink alcohol or use drugs. Family History: family history is not on file. The patients home medications have been reviewed. Allergies: Patient has no known allergies.     -------------------------------------------------- RESULTS -------------------------------------------------    LABS:  Results for orders placed or performed during the hospital encounter of 06/27/19   CBC auto differential   Result Value Ref Range    WBC 13.4 (H) 4.5 - 11.5 E9/L    RBC 4.05 3.80 - 5.80 E12/L    Hemoglobin 12.9 12.5 - 16.5 g/dL    Hematocrit 38.0 37.0 - 54.0 %    MCV 93.8 80.0 - 99.9 fL    MCH 31.9 26.0 - 35.0 pg    MCHC 33.9 32.0 - 34.5 %    RDW 14.1 11.5 - 15.0 fL    Platelets 857 822 - 475 E9/L    MPV 9.8 7.0 - 12.0 fL    Neutrophils % 95.7 (H) 43.0 - 80.0 %    Lymphocytes % 0.4 (L) 20.0 - 42.0 %    Monocytes % 1.7 (L) 2.0 - 12.0 %    Eosinophils % 2.6 0.0 - 6.0 %    Basophils % 0.2 0.0 - 2.0 %    Neutrophils # 12.86 (H) 1.80 - 7.30 E9/L    Lymphocytes # 0.00 (L) 1.50 - 4.00 E9/L    Monocytes # 0.27 0.10 - 0.95 E9/L    Eosinophils # 0.35 0.05 - 0.50 E9/L    Basophils # 0.00 0.00 - 0.20 E9/L    Polychromasia 1+     Poikilocytes 1+     Woodland Cells 1+     Ovalocytes 1+ (Results Pending)   CT Chest WO Contrast    (Results Pending)     CT head without contrast (ONRAD read): Mild atrophy and periventricular white matter disease. Otherwise normal examination of the brain. CT chest without contrast (ONRAD read): There is a small-moderate right pleural effusion with secondary compressive atelectasis of the right lung base. Superimposed inffiltrate at the right lung base not entirely excluded. There is an enlarged right paratracheal lymph node which could be reactive. Cardiomegaly. This X-Ray is independently viewed and interpreted by me:   - Study: Chest X-Ray   - Number of Views: 1  - Findings: Right pleural effusion      EKG: This EKG is signed and interpreted by me. Rate: 83  Rhythm: Paced  Interpretation: non-specific EKG and paced rhythm  Comparison: no previous EKG available      ------------------------- NURSING NOTES AND VITALS REVIEWED ---------------------------  Date / Time Roomed:  6/27/2019 12:27 AM  ED Bed Assignment:  06/06    The nursing notes within the ED encounter and vital signs as below have been reviewed. Patient Vitals for the past 24 hrs:   BP Temp Temp src Pulse Resp SpO2 Height Weight   06/27/19 0241 119/64 98.1 °F (36.7 °C) Oral 85 22 93 % -- --   06/27/19 0239 119/64 -- -- 87 20 92 % -- --   06/27/19 0201 126/68 100 °F (37.8 °C) Oral 84 16 95 % -- --   06/27/19 0033 136/68 97.1 °F (36.2 °C) Oral 90 16 96 % 6' (1.829 m) 175 lb (79.4 kg)       Oxygen Saturation Interpretation: Abnormal    ------------------------------------------ PROGRESS NOTES ------------------------------------------  Re-evaluation(s):  Time: 0327. Patients symptoms show no change  Repeat physical examination is not changed    Counseling:  I have spoken with the patient and discussed todays results, in addition to providing specific details for the plan of care and counseling regarding the diagnosis and prognosis.   Their questions are answered at this time and they are

## 2019-06-27 NOTE — ED NOTES
Patient was unable to ambulate, even with assist of 2 and walker, became weak and upper body backward onto bed. Dr Terri Mcneal updated.      Herlinda Lee RN  06/27/19 9493

## 2019-06-27 NOTE — H&P
Department of Internal Medicine  History and Physical    PCP: Dr. Alfie Browne   Admitting Physician: Dr. Suzi Sanchez:  Profound weakness    HISTORY OF PRESENT ILLNESS:    Diana Stewart is an 43-year-old gentleman who presented to [address] [place] [address] emergency department after suffering profound weakness. He was watching television and was unable to get out of his chair in an attempt to go to bed. Upon presentation to the emergency department he was found to be suffering from community-acquired pneumonia. He denies any overt shortness of breath. He does have intermittent coughing. He remains weak but states he is regaining some of his strength. He also admits to constipation. He is relatively healthy at baseline. He denies any chest pain. Aside from the weakness, he states he has returned to his baseline. No family members were present during my examination. PAST MEDICAL Hx:  Past Medical History:   Diagnosis Date    CAD (coronary artery disease)     Hyperlipidemia        PAST SURGICAL Hx:   Past Surgical History:   Procedure Laterality Date    VENTRICULAR CARDIAC PACEMAKER INSERTION  2017       FAMILY Hx:  History reviewed. No pertinent family history. HOME MEDICATIONS:  Prior to Admission medications    Medication Sig Start Date End Date Taking? Authorizing Provider   atorvastatin (LIPITOR) 20 MG tablet Take 20 mg by mouth daily   Yes Historical Provider, MD   cetirizine (ZYRTEC) 10 MG tablet Take 10 mg by mouth daily   Yes Historical Provider, MD   clopidogrel (PLAVIX) 75 MG tablet Take 75 mg by mouth daily   Yes Historical Provider, MD   metoprolol succinate (TOPROL XL) 100 MG extended release tablet Take 100 mg by mouth daily   Yes Historical Provider, MD       ALLERGIES:  Patient has no known allergies.     SOCIAL Hx:  Social History     Socioeconomic History    Marital status:      Spouse name: Not on file    Number of children: Not on file    Years of education: Not on file   

## 2019-06-28 NOTE — PLAN OF CARE
Problem: Falls - Risk of:  Goal: Will remain free from falls  Description  Will remain free from falls  6/28/2019 1320 by Whit Monsalve RN  Outcome: Met This Shift     Problem: Infection:  Goal: Will remain free from infection  Description  Will remain free from infection  Outcome: Met This Shift     Problem: Safety:  Goal: Free from accidental physical injury  Description  Free from accidental physical injury  6/28/2019 1320 by Whit Monsalve RN  Outcome: Met This Shift     Problem: Daily Care:  Goal: Daily care needs are met  Description  Daily care needs are met  Outcome: Met This Shift     Problem: Pain:  Goal: Patient's pain/discomfort is manageable  Description  Patient's pain/discomfort is manageable  6/28/2019 1320 by Whit Monsalve RN  Outcome: Met This Shift     Problem: Skin Integrity:  Goal: Skin integrity will stabilize  Description  Skin integrity will stabilize  Outcome: Met This Shift

## 2019-06-28 NOTE — PROGRESS NOTES
HPI:  Roxi Starr appears comfortable during my examination today. No family members were present. Unfortunately, white blood cell count has escalated along with the patient's creatinine. He states that he is breathing more comfortably today and feels at his baseline. He seems to be tolerating oral ingestion. He has been having intermittent periods of hypotension as per the nursing staff. Patient voiced no new complaints since hospital admission. Questions, answers, and tests reviewed. ROS:  Cardiovascular:   Denies any chest pain, irregular heartbeats, or palpitations. Respiratory:   No significant respiratory complaints. He denies any current shortness of breath. He has minimal coughing. Gastrointestinal:   Denies nausea, vomiting, diarrhea, or constipation. Denies any abdominal pain. Extremities:   Denies any lower extremity swelling or edema. Neurology:    Denies any headache or focal neurological deficits. Admits to chronic weakness and deconditioning. Derm:    Denies any rashes, ulcers, or excoriations. Denies bruising. Genitourinary:    Denies any urgency, frequency, hematuria. Voiding without difficulty. Physical Exam:    Vitals:    06/28/19 1015   BP: (!) 111/56   Pulse: 83   Resp:    Temp:    SpO2:        HEENT:  PERRLA. EOMI. Sclera clear. Buccal mucosa moist.    Neck:  Supple. Trachea midline. No thyromegaly. No JVD. No bruits. Heart:  Rhythm regular, rate controlled. Systolic murmur. Lungs:  Symmetrical. Clear to auscultation bilaterally. No wheezes. No rhonchi. No rales. Abdomen: Soft. Non-tender. Non-distended. Bowel sounds positive. No organomegaly or masses. No pain on palpation    Extremities:  Peripheral pulses present. No peripheral edema. No ulcers. Neurologic:  Alert x 3. No focal deficit. Cranial nerves grossly intact. Skin:  No petechia. No hemorrhage. No wounds.     CBC with Differential:    Lab Results   Component Value Date    WBC 22.4 06/28/2019    RBC 3.82 06/28/2019    HGB 11.9 06/28/2019    HCT 35.6 06/28/2019     06/28/2019    MCV 93.2 06/28/2019    MCH 31.2 06/28/2019    MCHC 33.4 06/28/2019    RDW 14.3 06/28/2019    LYMPHOPCT 0.5 06/28/2019    MONOPCT 1.2 06/28/2019    BASOPCT 0.2 06/28/2019    MONOSABS 0.26 06/28/2019    LYMPHSABS 0.12 06/28/2019    EOSABS 0.54 06/28/2019    BASOSABS 0.05 06/28/2019     BMP:    Lab Results   Component Value Date     06/28/2019    K 4.4 06/28/2019    CL 91 06/28/2019    CO2 21 06/28/2019    BUN 43 06/28/2019    LABALBU 3.3 06/27/2019    CREATININE 2.1 06/28/2019    CALCIUM 8.7 06/28/2019    GFRAA 36 06/28/2019    LABGLOM 30 06/28/2019    GLUCOSE 120 06/28/2019       Other Data:      Intake/Output Summary (Last 24 hours) at 6/28/2019 1033  Last data filed at 6/28/2019 0925  Gross per 24 hour   Intake 160 ml   Output 220 ml   Net -60 ml         Scheduled Medications:   magnesium oxide  400 mg Oral Daily    sodium chloride flush  10 mL Intravenous 2 times per day    enoxaparin  40 mg Subcutaneous Daily    atorvastatin  20 mg Oral Daily    cetirizine  10 mg Oral Daily    clopidogrel  75 mg Oral Daily    azithromycin  500 mg Oral Daily    cefTRIAXone (ROCEPHIN) IV  1 g Intravenous Q24H    ipratropium-albuterol  1 ampule Inhalation Q4H WA    polyethylene glycol  17 g Oral Daily    docusate sodium  100 mg Oral BID    metoprolol succinate  12.5 mg Oral Daily         Infusion Medications:   sodium chloride 75 mL/hr at 06/28/19 0819       Assessment:   1. Sepsis secondary to community-acquired pneumonia  2. Acute renal failure  3. Essential hypertension  4. Hyperlipidemia  5. Coronary artery disease    Plan:   Broad-spectrum antibiotic therapy is being employed. In the setting of worsening renal dysfunction and hyponatremia, we will institute IV fluids and monitor urinary output. Chronic comorbidities will also be monitored. We continue to await final pan culture results.   We will

## 2019-06-28 NOTE — PROGRESS NOTES
P Quality Flow/Interdisciplinary Rounds Progress Note        Quality Flow Rounds held on June 28, 2019    Disciplines Attending:  Bedside Nurse, ,  and Nursing Unit Leadership    Virginia Sahu was admitted on 6/27/2019 12:27 AM    Anticipated Discharge Date:  Expected Discharge Date: 06/29/19    Disposition:    Eb Score:  Eb Scale Score: 19    Readmission Risk              Risk of Unplanned Readmission:        12           Discussed patient goal for the day, patient clinical progression, and barriers to discharge.   The following Goal(s) of the Day/Commitment(s) have been identified:  [facility]      Jacob Nunez  June 28, 2019

## 2019-06-28 NOTE — CARE COORDINATION
Ss note:6/28/2019.1:44 PM met with pt to discuss transition of care needs, therapy rec snf. Pt is alert and oriented, pts JOCELYN Brice Brood [phone] present in room. Pt resides with wife Selina Simmonds cell [phone] in 2 story with first floor set up for pt, 8 square steps to enter with one rail. PTA pt uses a ww, has cane, does not drive. PTA pt was attending [facility] in [place] 2x week. Reviewed rec for SNF, pt is NOT receptive. HHC order noted for [facility] and pt prefers to discharge home with hhc. [facility] main drug for prescriptions. Plan is home with [facility].  KARIN Martinez

## 2019-06-29 NOTE — PLAN OF CARE
Problem: Falls - Risk of:  Goal: Will remain free from falls  6/29/2019 0445 by Toshia Coleman RN  Outcome: Met This Shift  6/28/2019 2219 by Navi Price RN  Outcome: Met This Shift  Goal: Absence of physical injury  6/29/2019 0445 by Toshia Coleman RN  Outcome: Met This Shift  6/28/2019 2219 by Navi Price RN  Outcome: Met This Shift     Problem: Infection:  Goal: Will remain free from infection  6/29/2019 0445 by Toshia Coleman RN  Outcome: Met This Shift  6/28/2019 2219 by Navi Price RN  Outcome: Met This Shift     Problem: Safety:  Goal: Free from accidental physical injury  6/29/2019 0445 by Toshia Coleman RN  Outcome: Met This Shift  6/28/2019 2219 by Navi Price RN  Outcome: Met This Shift  Goal: Free from intentional harm  6/29/2019 0445 by Toshia Coleamn RN  Outcome: Met This Shift  6/28/2019 2219 by Navi Price RN  Outcome: Met This Shift     Problem: Daily Care:  Goal: Daily care needs are met  6/29/2019 0445 by Toshia Coleman RN  Outcome: Met This Shift  6/28/2019 2219 by Navi Price RN  Outcome: Met This Shift     Problem: Pain:  Goal: Patient's pain/discomfort is manageable  6/29/2019 0445 by Toshia Coleman RN  Outcome: Met This Shift  6/28/2019 2219 by Navi Price RN  Outcome: Met This Shift     Problem: Skin Integrity:  Goal: Skin integrity will stabilize  6/29/2019 0445 by Toshia Coleman RN  Outcome: Met This Shift  6/28/2019 2219 by Navi Price RN  Outcome: Met This Shift     Problem: Discharge Planning:  Goal: Patients continuum of care needs are met  Outcome: Met This Shift

## 2019-06-29 NOTE — PROGRESS NOTES
Home health care has been arranged upon discharge. Continue current therapy. See orders for further plan of care.     Lexi Friedman D.O.  10:00 AM  6/29/2019

## 2019-06-30 NOTE — PLAN OF CARE
Problem: Falls - Risk of:  Goal: Will remain free from falls  Description  Will remain free from falls  Outcome: Met This Shift  Goal: Absence of physical injury  Description  Absence of physical injury  Outcome: Met This Shift     Problem: Infection:  Goal: Will remain free from infection  Description  Will remain free from infection  Outcome: Met This Shift     Problem: Safety:  Goal: Free from accidental physical injury  Description  Free from accidental physical injury  Outcome: Met This Shift  Goal: Free from intentional harm  Description  Free from intentional harm  Outcome: Met This Shift     Problem: Daily Care:  Goal: Daily care needs are met  Description  Daily care needs are met  Outcome: Met This Shift     Problem: Pain:  Goal: Patient's pain/discomfort is manageable  Description  Patient's pain/discomfort is manageable  Outcome: Met This Shift     Problem: Skin Integrity:  Goal: Skin integrity will stabilize  Description  Skin integrity will stabilize  Outcome: Met This Shift     Problem: Discharge Planning:  Goal: Patients continuum of care needs are met  Description  Patients continuum of care needs are met  Outcome: Met This Shift

## 2019-06-30 NOTE — PROGRESS NOTES
PHYSICAL THERAPY TREATMENT NOTE      6/30/2019  [id number]                      [facility]   [id number]                              8/21/1931    Patient Active Problem List   Diagnosis    Sepsis secondary to CAP ([facility] [address]    CAP (community acquired pneumonia) due to Chlamydia species       Recommendation for discharge: subacute rehab  Equipment prescriptions needed: to be determined     AM-[facility] Mobility Inpatient   How much difficulty turning over in bed?: A Little  How much difficulty sitting down on / standing up from a chair with arms?: A Lot  How much difficulty moving from lying on back to sitting on side of bed?: A Little  How much help from another person moving to and from a bed to a chair?: A Little  How much help from another person needed to walk in hospital room?: A Little  How much help from another person for climbing 3-5 steps with a railing?: A Lot  AM-PAC Inpatient Mobility Raw Score : 16  AM-PAC Inpatient T-Scale Score : 40.78  Mobility Inpatient CMS 0-100% Score: 54.16  Mobility Inpatient CMS G-Code Modifier : CK      Precautions: falls, alarm, 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 Patient is agreeable to treatment. Pain status: (measured on a visual analog scale with 0=no pain and 10=excruciating pain) 0/10. O: Pt was instructed in and performed the following:   Bed Mobility- Supine to sit- Minimal assistance      Scooting- Minimal assistance    Sit to supine- Not assessed   Transfers-sit to stand- Moderate assistance with bed elevated   Gait: Patient ambulated 20 feet and 50 feet x 2 using Wheeled Walker with Minimal assistance. Comments: V/C were needed for safety, upright posture, walker management, decreased space from walker, increased MARYANN, and obstacle negotiation.    Steps: Not assessed  Treatment: Pt was educated and facilitated on techniques to increase safety and independence with bed mobility, balance, functional transfers, and functional

## 2019-07-01 NOTE — PROGRESS NOTES
Fair/fair - good   Visual/  Perceptual Glasses: yes                         A:  -Balance: Sitting: fair       Standing: fair -  with Mod A with FWW; unsteadiness noted  -Endurance: fair - (2* to decreased endurance, strength, fatigue; weakness noted in B knees (flexion) and R lateral lean with support of wall when standing sinkside; encouragement for seated RB)  -Edema: none noted  -Safety: fair- (VC's for walker safety, sequencing, hand placement, ECT's)  - Pt/family education/training: bed mobility, transfer training, functional mobility, LE/UE dressing,  toileting/hygiene, sequencing, hand placement, walker safety, bathroom safety, DME, ECT's, grooming, ADL retraining  -Pt would benefit from additional ADLs, safety education, balance activities, transfer training, strength exercises, and over all endurance building.     P:  - Plan of care: Patient will be seen by OT PRN for therapeutic activity, ADL re-training, bed mobility,functional transfers, functional mobility, safety and fall prevention, balance and endurance activities, instruction and training in energy conservation principles, and   patient and family education.   - Patient and/or family understands diagnosis, prognosis and plan of care: yes   - ADL retraining, bathroom safety, DME    Treatment minutes: [address] [facility], [address] [place] [address]

## 2019-07-01 NOTE — PROGRESS NOTES
hours.    Assessment/Plan:   1. Sepsis secondary to community-acquired pneumonia  2. Acute renal failure  3. Hyponatremia  4. Essential hypertension  5. Hyperlipidemia  6. Coronary artery disease  7. Anemia of chronic disease          Social work to see for rehab placement, consider [facility] in the [place]. Renal function continues to improve. Hemoglobin remained stable at 11.5 today. Physical therapy and Occupational Therapy notes reviewed. Plan for discharge to rehab when accepted. See orders  Will discuss with attending  Tomas Jackson  3:26 PM    NOTE: This report was transcribed using voice recognition software. Every effort was made to ensure accuracy; however, inadvertent computerized transcription errors may be present. I saw and evaluated the patient. I agree with the findings and the plan of care as documented in the resident's note.     Ariadna Mays D.O., [facility]  3:32 PM  7/1/2019

## 2019-07-01 NOTE — PROGRESS NOTES
PHYSICAL THERAPY TREATMENT NOTE      7/1/2019  [id number]                      Jorge West   [id number]                              8/21/1931    Patient Active Problem List   Diagnosis    Sepsis secondary to CAP ([facility] [address]    CAP (community acquired pneumonia) due to Chlamydia species       Recommendation for discharge: subacute rehab  Equipment prescriptions needed: to be determined     AM-[facility] Mobility Inpatient   How much difficulty turning over in bed?: A Little  How much difficulty sitting down on / standing up from a chair with arms?: A Lot  How much difficulty moving from lying on back to sitting on side of bed?: A Little  How much help from another person moving to and from a bed to a chair?: A Little  How much help from another person needed to walk in hospital room?: A Little  How much help from another person for climbing 3-5 steps with a railing?: A Lot  AM-PAC Inpatient Mobility Raw Score : 16  AM-PAC Inpatient T-Scale Score : 40.78  Mobility Inpatient CMS 0-100% Score: 54.16  Mobility Inpatient CMS G-Code Modifier : CK      Precautions: falls, alarm, 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 Patient is agreeable to treatment. Family was present and supportive. Pt was sitting in chair at start of treatment. Pain status: (measured on a visual analog scale with 0=no pain and 10=excruciating pain) 0/10. O: Pt was instructed in and performed the following:   Bed Mobility- Supine to sit- Not assessed      Scooting- Not assessed   Sit to supine- Moderate assistance   Transfers-sit to stand- Moderate assistance with bed elevated   Gait: Patient ambulated 50 feet x 3 and 20 feet using Wheeled Walker with Minimal assistance. Comments: V/C were needed for safety, upright posture, walker management, decreased space from walker, increased MARYANN, and obstacle negotiation.    Steps: Not assessed  Treatment: Pt was educated and facilitated on techniques to increase safety and

## 2019-07-02 NOTE — DISCHARGE INSTR - COC
Manager/ signature: {Esignature:400779374}    PHYSICIAN SECTION    Prognosis: {Prognosis:3792836283}    Condition at Discharge: [address] Patient Condition:651479436}    Rehab Potential (if transferring to Rehab): {Prognosis:4379432158}    Recommended Labs or Other Treatments After Discharge: ***    Physician Certification: I certify the above information and transfer of Bhavesh Coleman  is necessary for the continuing treatment of the diagnosis listed and that he requires {Admit to Appropriate Level of Care:32563} for {GREATER/LESS:324029957} 30 days.      Update Admission H&P: {CHP DME Changes in WAEXK:675478741}    PHYSICIAN SIGNATURE:  Electronically signed by Candance Kind, DO on 7/2/19 at 2:19 PM

## 2019-07-02 NOTE — PLAN OF CARE
Problem: Falls - Risk of:  Goal: Will remain free from falls  7/2/2019 0443 by Fatuma Olivo RN  Outcome: Met This Shift  7/1/2019 2247 by Jayme Tellez RN  Outcome: Met This Shift  Goal: Absence of physical injury  7/2/2019 0443 by Fatuma Olivo RN  Outcome: Met This Shift  7/1/2019 2247 by Jayme Tellez RN  Outcome: Met This Shift     Problem: Infection:  Goal: Will remain free from infection  7/2/2019 0443 by Fatuma Olivo RN  Outcome: Met This Shift  7/1/2019 2247 by Jayme Tellez RN  Outcome: Met This Shift     Problem: Safety:  Goal: Free from accidental physical injury  7/2/2019 0443 by Fatuma Olivo RN  Outcome: Met This Shift  7/1/2019 2247 by Jayme Tellez RN  Outcome: Met This Shift  Goal: Free from intentional harm  Outcome: Met This Shift     Problem: Daily Care:  Goal: Daily care needs are met  Outcome: Met This Shift     Problem: Pain:  Goal: Patient's pain/discomfort is manageable  Outcome: Met This Shift     Problem: Skin Integrity:  Goal: Skin integrity will stabilize  Outcome: Met This Shift

## 2019-07-03 NOTE — PROGRESS NOTES
technique. Pt returned to bed. A:  Pt with decreased strength and endurance limiting function, increased ambulation distance. At risk for falls. P: Continue with physical therapy       Brenda Davalos PTA    GOALS to be met in 2 days. Bed mobility- Minimal assist                                                  Transfers-Sit to stand- Minimal assist                Gait: Patient to ambulate 30 feet using wheeled walker with Minimal assist        Increase rom in affected joints by 10%  Increase strength in affected mm groups by 1/3 grade  Increase balance to allow for improvement towards functional goals. Increase endurance to allow for improvement towards functional goals.

## 2019-07-03 NOTE — DISCHARGE SUMMARY
07/03/2019    CREATININE 1.1 07/03/2019    BUN 31 (H) 07/03/2019    CO2 23 07/03/2019    GLUCOSE 97 07/03/2019    ALT 28 06/27/2019    AST 43 (H) 06/27/2019    INR 1.1 06/27/2019     Lab Results   Component Value Date    INR 1.1 06/27/2019    PROTIME 12.9 (H) 06/27/2019      Lab Results   Component Value Date    TSH 2.680 06/28/2019       Pertinent Imaging  Xr Chest Standard (2 Vw)    Result Date: 7/1/2019  Reading location: 200 Indication: Dyspnea Comparison: Prior chest radiograph from 6/27/2019 Technique: Chest PA and lateral radiographs were obtained. Findings: A left subclavian pacer overlies the left chest wall. The cardiomediastinal silhouette is stable in size and contours. There is a stable small right pleural effusion with right basilar airspace opacity. There is no evidence of pneumothorax. Stable small right pleural effusion with right basilar airspace disease. Ct Head Wo Contrast    Result Date: 6/27/2019  LOCATION: 200 EXAM: CT HEAD WO CONTRAST COMPARISON: None HISTORY: Weakness TECHNIQUE: Noncontrast helical CT images were performed of the head. Coronal and sagittal reconstructions also obtained. Automated dose control was used for this exam. CONTRAST: No intravenous contrast administered. FINDINGS: HEAD: There is moderate diffuse atrophy. A moderate amount of patchy periventricular and subcortical white matter hypodensity noted, likely chronic microvascular ischemic related. There is no evidence of intracranial hemorrhage or mass. No extra-axial fluid is seen. The paranasal sinuses are clear. The globes and orbits are normal.  No abnormalities of the calvarium are identified. Chronic senescent changes with no hemorrhage or mass. Ct Chest Wo Contrast    Result Date: 6/27/2019  LOCATION:200 EXAM: CT CHEST WO CONTRAST COMPARISON: None HISTORY: Chest pain TECHNIQUE: Noncontrast helical chest CT was performed. Coronal and sagittal reconstructions also obtained.  Automated dose mitral regurgitation is present.   No mitral valve stenosis present.   Physiologic and/or trace tricuspid regurgitation.   Irregular rhythm. Disposition  The patient's condition is stable. At this time the patient is without objective evidence of an acute process requiring continuing hospitalization or inpatient management. They are stable for discharge with outpatient follow-up. I have spoken with the patient and discussed the results of the current hospitalization, in addition to providing specific details for the plan of care and counseling regarding the diagnosis and prognosis. The plan has been discussed in detail and they are aware of the specific conditions for emergent return, as well as the importance of follow-up. Their questions are answered at this time and they are agreeable with the plan for discharge to rehab.      Discharge Medications     Medication List      START taking these medications    docusate 100 MG Caps  Commonly known as:  COLACE, DULCOLAX  Take 100 mg by mouth 2 times daily     ipratropium-albuterol 0.5-2.5 (3) MG/3ML Soln nebulizer solution  Commonly known as:  DUONEB  Inhale 3 mLs into the lungs every 4 hours (while awake)     magnesium oxide 400 (241.3 Mg) MG Tabs tablet  Commonly known as:  MAG-OX  Take 1 tablet by mouth daily     polyethylene glycol packet  Commonly known as:  GLYCOLAX  Take 17 g by mouth daily        CHANGE how you take these medications    metoprolol succinate 25 MG extended release tablet  Commonly known as:  TOPROL XL  Take 0.5 tablets by mouth daily  What changed:    · medication strength  · how much to take        CONTINUE taking these medications    atorvastatin 20 MG tablet  Commonly known as:  LIPITOR     cetirizine 10 MG tablet  Commonly known as:  ZYRTEC     clopidogrel 75 MG tablet  Commonly known as:  PLAVIX     TYLENOL 325 MG tablet  Generic drug:  acetaminophen           Where to Get Your Medications      These medications were sent to [address], [address]  [facility] [place] [facility] [address]    Phone:  [phone]   · magnesium oxide 400 (241.3 Mg) MG Tabs tablet  · metoprolol succinate 25 MG extended release tablet  · polyethylene glycol packet     Information about where to get these medications is not yet available    Ask your nurse or doctor about these medications  · docusate 100 MG Caps  · ipratropium-albuterol 0.5-2.5 (3) MG/3ML Soln nebulizer solution         Follow-up / Instructions  · This patient is instructed to follow-up with his primary care physician. · Patient is instructed to follow-up with the consults listed above as directed by them. · he is instructed to resume home medications and take new medications as indicated in the list above. · If the patient has a recurrence of symptoms, he is instructed to go to the ED. Preparing for this patient's discharge, including paperwork, orders, instructions, and meeting with patient required > 30 minutes. Mahendra Watosn DO 10:47 AM 7/3/2019      We anticipate the acquisition of precertification today as the patient has been acceptable for discharge for several days. He was seated at the bedside chair during my examination today and is improving as expected. His wife was updated at the bedside. I saw, examined, and discussed the patient with the resident and agree with their assessment and plan.     Electronically signed by Dea Harman DO on 7/3/2019 at 11:17 AM

## 2019-07-03 NOTE — PROGRESS NOTES
Internal Medicine Progress Note  7/3/2019     HPI:   He appears to be doing much better today. His wife is at the bedside. All questions were answered. He is tolerating his diet. We discussed waiting rehab placement. He remained afebrile overnight. ROS: pertinent positives discussed in HPI. Constitutional:   Denies fever or chills, weight loss or gain, fatigue or malaise. HEENT:   Denies ear pain, sore throat, sinus or eye problems. Cardiovascular:   Denies any chest pain, irregular heartbeats, or palpitations. Respiratory:   Intermittent shortness of breath, minimal coughing, currently denies sputum production, hemoptysis, or wheezing. Gastrointestinal:   Denies nausea, vomiting, diarrhea. Complains of constipation.  Denies any abdominal pain. Genitourinary:    Denies any urgency, frequency, hematuria. Voiding  without difficulty. Extremities:   Denies lower extremity swelling, edema or cyanosis. Neurology:    Denies any headache or focal neurological deficits, Denies generalized weakness or memory difficulty. Psch:   Denies being anxious or depressed. Musculoskeletal:    Denies  myalgias, joint complaints or back pain. Integumentary:   Denies any rashes, ulcers, or excoriations.  Denies bruising. Hematologic/Lymphatic:  Denies bruising or bleeding. Physical Exam:  BP (!) 100/52   Pulse 63   Temp 97.3 °F (36.3 °C) (Oral)   Resp 19   Ht 6' (1.829 m)   Wt 177 lb 5 oz (80.4 kg)   SpO2 97%   BMI 24.05 kg/m²   General appearance:   Well preserved, alert, no distress. Head:  Normocephalic. No masses, lesions or tenderness. Eyes:  PERRLA. EOMI. Sclera clear. Impaired vision. ENT:  Ears normal. Mucosa normal.  Neck:    Supple. Trachea midline. No thyromegaly. No JVD. No bruits. Heart:    Rhythm regular. Rate controlled. No murmurs. Pacemaker left chest.  No overt signs of skin erosion. Lungs:    Diminished throughout improved. .  No wheezes. No rhonchi. No rales.   Abdomen: failure  3. Hyponatremia  4. Essential hypertension  5. Hyperlipidemia  6. Coronary artery disease  7. Anemia of chronic disease        Await rehab placement and hopeful for later today. The patient does look somewhat better today. His wife is at the bedside and all questions were answered. Continue to encourage activity and diet. Likely discharge today or tomorrow. Will discuss with attending  Candance Kind  10:36 AM    NOTE: This report was transcribed using voice recognition software. Every effort was made to ensure accuracy; however, inadvertent computerized transcription errors may be present.

## 2019-07-04 NOTE — PROGRESS NOTES
bruising or bleeding. Physical Exam:  No intake/output data recorded. Intake/Output Summary (Last 24 hours) at 7/4/2019 1621  Last data filed at 7/4/2019 1414  Gross per 24 hour   Intake 660 ml   Output --   Net 660 ml   I/O last 3 completed shifts: In: 660 [P.O.:660]  Out: -   No data found. Vital Signs:   Blood pressure (!) 116/55, pulse 64, temperature 98.9 °F (37.2 °C), temperature source Oral, resp. rate 18, height 6' (1.829 m), weight 177 lb 5 oz (80.4 kg), SpO2 97 %. Josué Casper is a 80 y. o.  male who is alert, responsive, oriented to person, place, and time. General appearance:   Well preserved, alert, no distress. Head:  Normocephalic. No masses, lesions or tenderness. Eyes:  PERRLA. EOMI. Sclera clear. Impaired vision. ENT:  Ears normal. Mucosa normal.  Neck:    Supple. Trachea midline. No thyromegaly. No JVD. No bruits. Heart:    Rhythm regular. Rate controlled. No murmurs. Pacemaker left chest.  No overt signs of skin erosion. Lungs:    Diminished throughout. .  No wheezes. No rhonchi. No rales. Abdomen:   Soft. Non-tender. Non-distended. Bowel sounds positive. No organomegaly or masses. No pain on palpation. Extremities:    Peripheral pulses present. No peripheral edema. No ulcers. No cyanosis. No clubbing. Neurologic:    Alert x 3. No focal deficit. Cranial nerves grossly intact. No focal weakness. Psych:   Behavior is normal. Mood appears normal. Speech is not rapid and/or pressured. Musculoskeletal:   Spine ROM normal. Muscular strength intact. Gait not assessed. Integumentary:  No rashes  Skin normal color and texture.   Genitalia/Breast:  Deferred      Allergy:  No Known Allergies     Medication:  Scheduled Meds:   enoxaparin  30 mg Subcutaneous Daily    magnesium oxide  400 mg Oral Daily    sodium chloride flush  10 mL Intravenous 2 times per day    atorvastatin  20 mg Oral Daily    cetirizine  10 mg Oral Daily    clopidogrel  75 mg Oral Daily    microvascular ischemic related. There is no evidence of intracranial hemorrhage or mass. No extra-axial fluid is seen. The paranasal sinuses are clear. The globes and orbits are normal.  No abnormalities of the calvarium are identified. Chronic senescent changes with no hemorrhage or mass. Ct Chest Wo Contrast    Result Date: 6/27/2019  LOCATION:200 EXAM: CT CHEST WO CONTRAST COMPARISON: None HISTORY: Chest pain TECHNIQUE: Noncontrast helical chest CT was performed. Coronal and sagittal reconstructions also obtained. Automated dose control was used for this exam. CONTRAST: No intravenous contrast administered. FINDINGS: SUPPORT DEVICES: None LUNGS/CENTRAL AIRWAYS/PLEURA: Small right effusion is identified with right lower lobe consolidation. The right lower lobe parenchyma shows a curvilinear configuration and there appears to be pleural thickening favoring a chronic effusion with rounded atelectasis. Left lung is clear. HEART/PERICARDIUM/GREAT VESSELS: Cardiac size is normal. There is no pericardial effusion. The great vessels of the chest are normal in caliber. LYMPH NODES: No thoracic adenopathy by size criteria. NECK BASE/CHEST WALL/DIAPHRAGM: No soft tissue lesions or diaphragmatic abnormality. UPPER ABDOMEN: Limited images through the upper abdomen are unremarkable. OSSEOUS STRUCTURES: No suspicious lytic or blastic lesions. No fractures. 1.  Chronic appearing right effusion with probable rounded atelectasis in the right lower lobe. Xr Chest Portable    Result Date: 6/27/2019  LOCATION: 200 EXAM: XR CHEST PORTABLE COMPARISON: None HISTORY: Shortness of breath TECHNIQUE: Single frontal view of the chest was obtained. FINDINGS:  SUPPORT DEVICES: None. LUNGS: Small right effusion is identified with right lower lobe consolidation. PLEURA: No pneumothorax noted. LUNG VOLUMES: Satisfactory inspirator effort. MEDIASTINAL STRUCTURES: No lymphadenopathy. Normal aortic contour.  HEART SIZE: Normal.

## 2019-07-05 NOTE — PROGRESS NOTES
with bed mobility, balance, functional transfers, and functional mobility. Pt stood from chair and ambulated in room. Pt requested to sit and don shoes. Pt donned shoes in chair and then ambulated in hallway. Pt ambulated to/from bathroom and required assist with commode transfers. Pt ambulated to bedside chair. Comment: Call light left by patient. Pt was sitting in chair at end of treatment, alarm was activated. A: Pt was able to progress ambulation distance with no LOB noted. Pt also had no knee buckling but con't to c/o BLE weakness and has difficulty with sit to stand transfers. Pt demo'd decreased strength and endurance and is at risk of falls. P: Continue with physical therapy       Caryn Ny, PTA    GOALS to be met in 2 days. Bed mobility- Minimal assist                                                  Transfers-Sit to stand- Minimal assist                Gait: Patient to ambulate 30 feet using wheeled walker with Minimal assist        Increase rom in affected joints by 10%  Increase strength in affected mm groups by 1/3 grade  Increase balance to allow for improvement towards functional goals. Increase endurance to allow for improvement towards functional goals.

## 2019-07-05 NOTE — PROGRESS NOTES
Internal Medicine Progress Note    Kymberly Daley. Alonso Plunkett., & [address] [facility] [address] Alonso Plunkett., F.A.C.O.I. Verenice Alexander D.O., F.A.C.O.I. Primary Care Physician: Abel Mcclure MD   Admitting Physician:  Samir Smith DO  Admission date and time: 6/27/2019 12:27 AM    Room:  [address] [place] [address]  Admitting diagnosis: Sepsis ([address] [A41.9]  CAP (community acquired pneumonia) due to Chlamydia species [J16.0]    Patient Name: Julia Gilman  MRN: [id number]    Date of Service: 7/5/2019     Subjective:    He remains in stable condition and feels better today. No family at the bedside. All questions were answered. He denies any chest pain, abdominal pain, nausea, vomiting. He is tolerating his diet. Review of System:   Constitutional:   Denies fever or chills, weight loss or gain, fatigue or malaise. HEENT:   Denies ear pain, sore throat, sinus or eye problems. Cardiovascular:   Denies any chest pain, irregular heartbeats, or palpitations. Respiratory:   Intermittent shortness of breath, minimal coughing, currently denies sputum production, hemoptysis, or wheezing. Gastrointestinal:   Denies nausea, vomiting, diarrhea. Denies any abdominal pain. Genitourinary:    Denies any urgency, frequency, hematuria. Voiding  without difficulty. Extremities:   Denies lower extremity swelling, edema or cyanosis. Neurology:    Denies any headache or focal neurological deficits, Generalized weakness and deconditioning. Psch:   Denies being anxious or depressed. Musculoskeletal:    Denies  myalgias, joint complaints or back pain. Integumentary:   Denies any rashes, ulcers, or excoriations. Denies bruising. Hematologic/Lymphatic:  Denies bruising or bleeding. Physical Exam:  No intake/output data recorded.     Intake/Output Summary (Last 24 hours) at 7/5/2019 0958  Last data filed at 7/4/2019 1940  Gross per 24 hour   Intake 600 ml   Output --   Net 600 ml   I/O last 3 completed secondary to CAP [facility])  Active Problems:    CAP (community acquired pneumonia) due to Chlamydia species  Resolved Problems:    * No resolved hospital problems. *      Assessment:  1. Sepsis secondary to community-acquired pneumonia  2. Acute renal failure  3. Hyponatremia  4. Essential hypertension  5. Hyperlipidemia  6. Coronary artery disease  7. Anemia of chronic disease    Plan:   Await for pre-CERT and likely discharge today. Continue current plan. Continue physical therapy lactational therapy. Bacilio Fleming D.O., F.A.C.O.I.  7/5/2019  3:10 PM

## 2019-07-15 PROBLEM — R65.21 SEPTIC SHOCK (HCC): Status: ACTIVE | Noted: 2019-01-01

## 2019-07-15 PROBLEM — I70.8 LEFT SUBCLAVIAN ARTERY OCCLUSION: Status: ACTIVE | Noted: 2019-01-01

## 2019-07-15 PROBLEM — A41.9 SEPTIC SHOCK (HCC): Status: ACTIVE | Noted: 2019-01-01

## 2019-07-15 PROBLEM — J84.9 ILD (INTERSTITIAL LUNG DISEASE) (HCC): Status: ACTIVE | Noted: 2019-01-01

## 2019-07-15 PROBLEM — E87.1 HYPONATREMIA: Status: ACTIVE | Noted: 2019-01-01

## 2019-07-15 PROBLEM — N17.9 AKI (ACUTE KIDNEY INJURY) (HCC): Status: ACTIVE | Noted: 2019-01-01

## 2019-07-15 PROBLEM — J96.01 ACUTE RESPIRATORY FAILURE WITH HYPOXIA (HCC): Status: ACTIVE | Noted: 2019-01-01

## 2019-07-15 PROBLEM — Z92.82 RECEIVED INTRAVENOUS TISSUE PLASMINOGEN ACTIVATOR (TPA) IN EMERGENCY DEPARTMENT: Status: ACTIVE | Noted: 2019-01-01

## 2019-07-15 PROBLEM — J18.9 PNA (PNEUMONIA): Status: ACTIVE | Noted: 2019-01-01

## 2019-07-15 PROBLEM — I63.9 ISCHEMIC STROKE (HCC): Status: ACTIVE | Noted: 2019-01-01

## 2019-07-15 PROBLEM — I50.23 ACUTE ON CHRONIC SYSTOLIC CONGESTIVE HEART FAILURE (HCC): Status: ACTIVE | Noted: 2019-01-01

## 2019-07-15 PROBLEM — D64.9 ANEMIA: Status: ACTIVE | Noted: 2019-01-01

## 2019-07-15 PROBLEM — Z95.0 CARDIAC PACEMAKER IN SITU: Status: ACTIVE | Noted: 2019-01-01

## 2019-07-15 PROBLEM — E78.5 HLD (HYPERLIPIDEMIA): Status: ACTIVE | Noted: 2019-01-01

## 2019-07-15 PROBLEM — R94.31 QT PROLONGATION: Status: ACTIVE | Noted: 2019-01-01

## 2019-07-15 PROBLEM — I10 ESSENTIAL HYPERTENSION: Status: ACTIVE | Noted: 2019-01-01

## 2019-07-15 PROBLEM — I25.10 CORONARY ARTERY DISEASE INVOLVING NATIVE CORONARY ARTERY: Status: ACTIVE | Noted: 2019-01-01

## 2019-07-15 PROBLEM — R31.9 HEMATURIA: Status: ACTIVE | Noted: 2019-01-01

## 2019-07-15 PROBLEM — I65.29 STENOSIS OF CAROTID ARTERY: Status: ACTIVE | Noted: 2019-01-01

## 2019-07-15 PROBLEM — D75.838 REACTIVE THROMBOCYTOSIS: Status: ACTIVE | Noted: 2019-01-01

## 2019-07-15 NOTE — ED NOTES
Unable to obtain blood cultures at this time due to antibiotics already having been started. Dr Dalia Carpenter aware  also OK to disregard type and screen order at this time per Dr Dalia Carpenter.          Berna Fernandez RN  07/15/19 1947

## 2019-07-15 NOTE — ED PROVIDER NOTES
administering the exam and work quickly. Except where indicated, the patient should not be coached (i.e., repeated requests to patient to make a special effort). 1a  Level of consciousness: 2=not alert, requires repeated stimulation to attend, or is obtunded and requires strong or painful stimulation to make movements (not stereotyped)   1b. LOC questions:  2=Performs neither task correctly   1c. LOC commands: 2=Performs neither task correctly   2. Best Gaze: 2=forced deviation, or total gaze paresis not overcome by oculocephalic maneuver   3. Visual: 2=Complete hemianopia   4. Facial Palsy: 3=Complete paralysis of one or both sides (absence of facial movement in the upper and lower face)   5a. Motor left arm: 2=Some effort against gravity, limb cannot get to or maintain (if cured) 90 (or 45) degrees, drifts down to bed, but has some effort against gravity   5b. Motor right arm: 3=No effort against gravity, limb falls   6a. motor left le=No movement   6b  Motor right le=No movement   7. Limb Ataxia: 2=Present in two limbs   8. Sensory: 2=Severe to total sensory loss; patient is not aware of being touched in face, arm, leg   9. Best Language:  3=Mute, global aphasia; no usable speech or auditory comprehension   10. Dysarthria: 2=Severe; patient speech is so slurred as to be unintelligible in the absence of or our of proportion to any dysphagia, or is mute/anarthric   11. Extinction and Inattention: 2=Profound colette-inattention or colette-inattention to more than one modality. Does not recognize own hand or orients only to one side of space   12. Distal motor function: 2=No voluntary extension after 5 seconds. Movement of the fingers at another time are not scored    Total:   44       MDM  Number of Diagnoses or Management Options  Diagnosis management comments: Patient is a 80 yr old male with a pacemaker and CAD presenting with change in mental status, last known well was 30 min prior to arriving. on BiPAP. [AL]      ED Course User Index  [AL] Siobhan Lewis DO       --------------------------------------------- PAST HISTORY ---------------------------------------------  Past Medical History:  has a past medical history of CAD (coronary artery disease) and Hyperlipidemia. Past Surgical History:  has a past surgical history that includes Ventricular cardiac pacer insertion (2017). Social History:  reports that he has never smoked. He has never used smokeless tobacco. He reports that he does not drink alcohol or use drugs. Family History: family history is not on file. The patients home medications have been reviewed. Allergies: Patient has no known allergies.     -------------------------------------------------- RESULTS -------------------------------------------------    LABS:  Results for orders placed or performed during the hospital encounter of 07/15/19   CBC Auto Differential   Result Value Ref Range    WBC 9.9 4.5 - 11.5 E9/L    RBC 3.61 (L) 3.80 - 5.80 E12/L    Hemoglobin 11.4 (L) 12.5 - 16.5 g/dL    Hematocrit 34.3 (L) 37.0 - 54.0 %    MCV 95.0 80.0 - 99.9 fL    MCH 31.6 26.0 - 35.0 pg    MCHC 33.2 32.0 - 34.5 %    RDW 16.2 (H) 11.5 - 15.0 fL    Platelets 912 (H) 061 - 450 E9/L    MPV 10.4 7.0 - 12.0 fL    Neutrophils % 52.6 43.0 - 80.0 %    Lymphocytes % 27.2 20.0 - 42.0 %    Monocytes % 6.1 2.0 - 12.0 %    Eosinophils % 14.0 (H) 0.0 - 6.0 %    Basophils % 1.1 0.0 - 2.0 %    Neutrophils # 5.25 1.80 - 7.30 E9/L    Lymphocytes # 2.67 1.50 - 4.00 E9/L    Monocytes # 0.59 0.10 - 0.95 E9/L    Eosinophils # 1.39 (H) 0.05 - 0.50 E9/L    Basophils # 0.00 0.00 - 0.20 E9/L    Anisocytosis 1+     Poikilocytes 1+     Angel Cells 1+     Ovalocytes 1+    Comprehensive Metabolic Panel   Result Value Ref Range    Sodium 131 (L) 132 - 146 mmol/L    Potassium 4.7 3.5 - 5.0 mmol/L    Chloride 99 98 - 107 mmol/L    CO2 22 22 - 29 mmol/L    Anion Gap 10 7 - 16 mmol/L    Glucose 146 (H) 74 - 99 mg/dL

## 2019-07-16 NOTE — FLOWSHEET NOTE
Patient remains in left soft wrist restraint due to not following commands and verbal redirections when patient reaches for bipap and lines pertinent to his care. Will continue to monitor.

## 2019-07-16 NOTE — H&P
with QTC of 539. CT scan of the head without contrast was negative, CTA shows findings suggestive of stroke involving the left frontal lobe extending to the level of the left insula and left temporal lobe. CTA of the head and neck shows occlusion of proximal M2 segment of left MCA, severe atherosclerosis of the left subclavian artery with occlusion, 50% stenosis of right cervical internal carotid artery. Patient received TPA in the ER. He is being admitted for further management. Past Medical History:          Diagnosis Date    CAD (coronary artery disease)     Hyperlipidemia        Past Surgical History:          Procedure Laterality Date    VENTRICULAR CARDIAC PACEMAKER INSERTION  2017       Medications Prior to Admission:      Prior to Admission medications    Medication Sig Start Date End Date Taking?  Authorizing Provider   finasteride (PROSCAR) 5 MG tablet Take 5 mg by mouth daily   Yes Historical Provider, MD   ipratropium-albuterol (DUONEB) 0.5-2.5 (3) MG/3ML SOLN nebulizer solution Inhale 3 mLs into the lungs every 4 hours (while awake) 7/2/19  Yes Gladys Kelly DO   metoprolol succinate (TOPROL XL) 25 MG extended release tablet Take 0.5 tablets by mouth daily 7/3/19  Yes Gladys Kelly DO   docusate sodium (COLACE, DULCOLAX) 100 MG CAPS Take 100 mg by mouth 2 times daily 7/2/19  Yes Gladys Kelly DO   polyethylene glycol [facility]) packet Take 17 g by mouth daily 7/3/19 8/2/19 Yes Gladys Kelly DO   magnesium oxide (MAG-OX) 400 (241.3 Mg) MG TABS tablet Take 1 tablet by mouth daily 7/3/19  Yes Gladys Kelly DO   atorvastatin (LIPITOR) 20 MG tablet Take 20 mg by mouth daily   Yes Historical Provider, MD   cetirizine (ZYRTEC) 10 MG tablet Take 10 mg by mouth daily   Yes Historical Provider, MD   clopidogrel (PLAVIX) 75 MG tablet Take 75 mg by mouth daily   Yes Historical Provider, MD   acetaminophen (TYLENOL) 325 MG tablet Take 650 mg by mouth nightly   Yes Historical Provider, MD       Allergies: Patient has no known allergies. Social History:      The patient currently lives at home with his wife. TOBACCO:   reports that he has never smoked. He has never used smokeless tobacco.  ETOH:   reports that he does not drink alcohol. Family History:     Reviewed in detail Positive as follows: Diabetes, heart disease, son  from brain tumor. History reviewed. No pertinent family history. REVIEW OF SYSTEMS:   Pertinent positives as noted in the HPI. Unable to obtain due to altered mental status. PHYSICAL EXAM:    BP 95/75   Pulse 89   Temp 98.6 °F (37 °C) (Temporal)   Resp (!) 34   Ht 6' (1.829 m)   Wt 178 lb (80.7 kg)   SpO2 100%   BMI 24.14 kg/m²     General appearance: Elderly male, in severe respiratory distress on the BIPAP, afebrile, appears stated age, restless. HEENT:  Normal cephalic, atraumatic without obvious deformity. Pupils equal, round, and nonreactive to light. Left gaze preference. Conjunctivae/corneas clear. Neck: Supple, with limited range of motion. No jugular venous distention. Trachea midline. Respiratory: Increased work of breathing, being ventilated with BiPAP. Diffuse rales and rhonchi throughout the lungs cardiovascular:  Regular rate and rhythm with normal S1/S2 without murmurs, rubs or gallops. Abdomen: Soft, non-tender, non-distended with diminished bowel sounds. Musculoskeletal:  No clubbing, peripheral cyanosis of fingers, left greater than right edema 2+ bilaterally. Limited range of motion without deformity. Skin: Skin color, texture, turgor normal.  No rashes or lesions. Neurologic: Dense hemiplegia right side of the body, left gaze preference, delirious, minimally follows command.   Psychiatric: Awake but disoriented, thought content inappropriate, no insight  Capillary Refill: Brisk,< 3 seconds   Peripheral Pulses: +2 palpable, equal bilaterally       Labs:     Recent Labs     07/15/19  1637   WBC 9.9   HGB 11.4*   HCT 34.3*   * Recent Labs     07/15/19  1637   *   K 4.7   CL 99   CO2 22   BUN 23   CREATININE 1.4*   CALCIUM 9.6     Recent Labs     07/15/19  1637   AST 36   ALT 32   BILITOT 0.7   ALKPHOS 194*     Recent Labs     07/15/19  1637   INR 1.1     Recent Labs     07/15/19  1637   TROPONINI 0.03       Urinalysis:      Lab Results   Component Value Date    NITRU Negative 07/15/2019    WBCUA 0-1 07/15/2019    BACTERIA NONE 07/15/2019    RBCUA >20 07/15/2019    BLOODU LARGE 07/15/2019    SPECGRAV 1.010 07/15/2019    GLUCOSEU Negative 07/15/2019       Radiology:   Reviewed and documented  CT BRAIN PERFUSION   Final Result      Findings are suggestive of area of stroke involving left frontal lobe   extending to level of left insula and left temporal lobe. CTA NECK W CONTRAST   Final Result      1. Occlusion of proximal M2 segment left middle cerebral artery. 2. Severe atherosclerotic disease at proximal left subclavian artery   with occlusion. Distal left subclavian artery reconstitutes and blood   flow seen at level of left axillary artery. 3. 50% luminal stenosis is seen involving proximal right cervical   internal carotid artery due to use severe atherosclerotic disease. ALERT:  THIS IS AN ABNORMAL REPORT. CTA HEAD W CONTRAST   Final Result      1. Occlusion of proximal M2 segment left middle cerebral artery. 2. Severe atherosclerotic disease at proximal left subclavian artery   with occlusion. Distal left subclavian artery reconstitutes and blood   flow seen at level of left axillary artery. 3. 50% luminal stenosis is seen involving proximal right cervical   internal carotid artery due to use severe atherosclerotic disease. ALERT:  THIS IS AN ABNORMAL REPORT. XR CHEST PORTABLE   Final Result      Findings suggest interstitial pulmonary edema or pneumonia on   background of chronic interstitial changes and probable right pleural   effusion.  Short-term follow-up could be weights. Consult cardiology. Pro BNP and troponins. #6. Shock possibly septic/cardiogenic currently on levophed. IV Vanco and Zosyn. Monitor vancomycin level. Blood cultures cortisol level and pro calcitonin. Hydrocortisone IV x1. Cardiac enzymes and lactate. #7.  Acute kidney injury, creatinine is up from 1.1-1.4. Unable to get fluids at this time given pulmonary edema. Monitor kidney function. #8.  Hyponatremia, ? Volume overload. monitor BMP. #9. Anemia, monitor hemoglobin. #10. Reactive thrombocytosis. #11. QTc prolongation. Follow EKG. Check mag and replace. Avoid medications I will make this worse. #12. Coronary artery disease status post stents x1. #13. Hyperlipidemia, statin regimen. #14. Hypertension, blood pressure is running low, hold medications. #15. Presence of cardiac pacemaker unknown if he has had a history of A. fib in the past, he was documented as having irregular heartbeat during his last echo in June. #16. Microscopic hematuria monitor urine especially post TPA. #17. Patient is critically ill, prognosis is guarded. I discussed with wife at length and answered her questions. Palliative care consult. DVT Prophylaxis: SCDs, no anticoagulation for the next 24 hours. Diet: Diet NPO Effective Now  Code Status: Prior DNR CCA    PT/OT Eval Status: Evaluation and treatment when able    Dispo -inpatient/neuro ICU       Bryna Canavan, MD    Thank you Raúl Krishnan MD for the opportunity to be involved in this patient's care.

## 2019-07-16 NOTE — CONSULTS
Palliative Care Department    Palliative Care Consult      Chief Complaint: Albert Trinh is a 80 y.o. male with a chief complaint of altered mental status/possible CVA. Assessment/Plan        Active Hospital Problems    Diagnosis Date Noted    Hypotension [I95.9]     [facility] [I63.9] 07/15/2019    Received intravenous tissue plasminogen activator (tPA) in emergency department [Z92.82] 07/15/2019    Stenosis of carotid artery [I65.29] 07/15/2019    Left subclavian artery occlusion [I70.8] 07/15/2019    Acute respiratory failure with hypoxia (HCC) [J96.01] 07/15/2019    PNA (pneumonia) [J18.9] 07/15/2019    ILD (interstitial lung disease) ([facility] [J84.9] 07/15/2019    HAIDER (acute kidney injury) ([facility] [N17.9] 07/15/2019    Hyponatremia [E87.1] 07/15/2019    Anemia [D64.9] 07/15/2019    Reactive thrombocytosis [R79.89] 07/15/2019    QT prolongation [R94.31] 07/15/2019    Septic shock (HCC) [A41.9, R65.21] 07/15/2019    Coronary artery disease involving native coronary artery [I25.10] 07/15/2019    HLD (hyperlipidemia) [E78.5] 07/15/2019    Essential hypertension [I10] 07/15/2019    Cardiac pacemaker in situ [Z95.0] 07/15/2019    Hematuria [R31.9] 07/15/2019    Acute on chronic systolic congestive heart failure ([facility] [I50.23] 07/15/2019       Palliative Care Goals of Care/Code Status:   -Palliative medicine was consulted for goals of care and CODE STATUS. It appears that the patient has an occlusion of the proximal M2 segment of the left middle cerebral artery. Findings are suggestive of a stroke involving the left frontal lobe extending to the level of the left insula and left temporal lobe. The patient is obtunded and remains on CPAP. No family present at this time in the room. Attempts to call the patient's wife, Katrin Abraham, at [phone] were unsuccessful in reaching the patient's wife.   It appears that Dr. Bandar Maradiaga discussed CODE STATUS with the patient, when she was at has since remained constant, severe, associated with shortness of breath. No aggravating factors or relieving factors known. He did not complain about anything prior to symptom onset. He has had leg swelling. Patient is noted in the ER to have weakness involving the right side of the body with leftward gaze preference.      Vital signs notable for respiratory rate in the 30s, blood pressure 75/64 for which he has required Levophed, NIH score initially 39, now 16 following TPA. Labs shows hemoglobin of 11.4, platelet count 987, sodium 131, glucose 146, troponin 0 0.03, creatinine 1.4, INR 1.1. EKG shows V paced rhythm rate of 115 with QTC of 539. CT scan of the head without contrast was negative, CTA shows findings suggestive of stroke involving the left frontal lobe extending to the level of the left insula and left temporal lobe. CTA of the head and neck shows occlusion of proximal M2 segment of left MCA, severe atherosclerosis of the left subclavian artery with occlusion, 50% stenosis of right cervical internal carotid artery.     Patient received TPA in the ER. He is being admitted for further management. Palliative Medicine Specific HPI:  The patient remains obtunded, and a CPAP mask remains in place. The patient does grasp with his left hand and does move his toes of his left foot. He does occasionally open his eyes to verbal stimuli. He is unable to respond verbally to questions. I am unable to obtain any history from the patient this time. History is obtained from the patient's medical record (as above). Family not present in the room at this time. Further history to be obtained once family arrives. Goals of care: continue current management and to be determined  Advance Directives: DNR-CCA  Surrogate:Spouse - [phone]   Prognosis: unknown  Spiritual assessment: No spiritual distress identified   Bereavement and grief: to be determined.     Past Medical History:   Diagnosis Date   

## 2019-07-16 NOTE — CONSULTS
PRN  atorvastatin (LIPITOR) tablet 40 mg, 40 mg, Oral, Nightly  vancomycin (VANCOCIN) 1,500 mg in sodium chloride 0.9 % 300 mL IVPB, 1,500 mg, Intravenous, Q24H    Allergies:  Patient has no known allergies.     Social History (per EMR, unable to obtain from patient):    Social History     Socioeconomic History    Marital status:      Spouse name: Not on file    Number of children: Not on file    Years of education: Not on file    Highest education level: Not on file   Occupational History    Not on file   Social Needs    Financial resource strain: Not on file    Food insecurity:     Worry: Not on file     Inability: Not on file    Transportation needs:     Medical: Not on file     Non-medical: Not on file   Tobacco Use    Smoking status: Never Smoker    Smokeless tobacco: Never Used   Substance and Sexual Activity    Alcohol use: Never     Frequency: Never    Drug use: Never    Sexual activity: Not on file   Lifestyle    Physical activity:     Days per week: Not on file     Minutes per session: Not on file    Stress: Not on file   Relationships    Social connections:     Talks on phone: Not on file     Gets together: Not on file     Attends Oriental orthodox service: Not on file     Active member of club or organization: Not on file     Attends meetings of clubs or organizations: Not on file     Relationship status: Not on file    Intimate partner violence:     Fear of current or ex partner: Not on file     Emotionally abused: Not on file     Physically abused: Not on file     Forced sexual activity: Not on file   Other Topics Concern    Not on file   Social History Narrative    Not on file       Family History:   Unobtainable    Review of Systems:   Unobtainable    Physical Exam:   BP (!) 112/54   Pulse 99   Temp 98.7 °F (37.1 °C) (Axillary)   Resp 16   Ht 6' (1.829 m)   Wt 173 lb 3.2 oz (78.6 kg)   SpO2 96%   BMI 23.49 kg/m²   CONST:  Well developed, well nourished elderly male who appears

## 2019-07-16 NOTE — CONSULTS
I independently performed an evaluation on the patient. I have reviewed the above documentation completed by the advance practitioner. Please see my additional contributions to the HPI, physical exam, assessment and medical decision making. See accompanying documentation for full consult. ASSESSMENT AND PLAN:  1. CHF: LVEF 45-50% 6/27/19. Limited echo. Supportive care. Difficult situation as patient looks like her could benefit from diuresis but is requiring pressors. 2. CAD/Abnormal troponin: Statin/ASA. No BB due to hypotension. 3. Sepsis/Shock/Hypotension: On  Levophed. Supportive care. 4. CVA: TPA'd. Neurology following. 5. HAIDER: Follow labs. 6. Pacer: Old records. 7. Lipids: Statin. Janee Reynoso D.O.   Cardiologist  Cardiology, [facility]

## 2019-07-17 NOTE — PROGRESS NOTES
2 peripheral IV sites (which were not charted on) placed by EMS removed at this time due to the patient having a CVC and peripheral IV site placed in our ED. Pressure held for 5 min and pressure dressing applied.
Date: 7/16/2019    Time: 1:48 AM    Patient On BIPAP/CPAP/ Non-Invasive Ventilation? Yes    If no must comment. Facial area red/color change? Yes           If YES are Blister/Lesion present? No   If yes must notify nursing staff  BIPAP/CPAP skin barrier? Yes    Skin barrier type:mepilex       Comments: Pt. Had a liquicel on, but redness was noted on bridge of nose. Liquicel removed and mepilex applied.         Pancho Ag
Hospitalist Progress Note      PCP: Delisa Velasquez MD    Date of Admission: 7/15/2019  Days in the hospital: 2    Chief Complaint: Unresponsiveness    Hospital Course:     Given TPA in the ED. Placed in medical ICU. Found to have stroke in the L frontal lobe. Cardiology consulted regarding CHF. Limited echo ordered. They felt that things would be difficult as he would benefit from diuresis but is currently requiring pressors. Neurology was consulted. They recommended getting a repeat head CT. Palliative care was consulted. The patient was made a DNR CC and hospice has been consulted. Subjective  Patient seen and examined at bedside. Patient not following commands. No family at bedside. Resting comfortably. Medications:  Reviewed    Infusion Medications    morphine infusion 1 mg/mL 2 mg/hr (07/17/19 2096)     Scheduled Medications   PRN Meds: morphine, midazolam, sodium chloride flush      Intake/Output Summary (Last 24 hours) at 7/17/2019 0846  Last data filed at 7/17/2019 0707  Gross per 24 hour   Intake 716 ml   Output 495 ml   Net 221 ml       Exam:    BP (!) 105/52   Pulse 77   Temp 98.4 °F (36.9 °C) (Temporal)   Resp 20   Ht 6' (1.829 m)   Wt 173 lb 3.2 oz (78.6 kg)   SpO2 100%   BMI 23.49 kg/m²     General appearance: Elderly male, resting peacefully on nasal cannula O2, afebrile, appears stated age. HEENT:  Normal cephalic, atraumatic without obvious deformity. Neck: Supple, with limited range of motion. No jugular venous distention. Trachea midline. Respiratory: Normal respiratory effort. On nasal cannula O2. Diffuse rales and rhonchi throughout the lungs   Cardiovascular:  Regular rate and rhythm with normal S1/S2 without murmurs, rubs or gallops. Abdomen: Soft, non-tender, non-distended with diminished bowel sounds. Musculoskeletal:  No clubbing, peripheral cyanosis of fingers, left greater than right edema 2+ bilaterally.   Limited range of motion without
Patient to new room with transport. Family at bedside.  O2 @ 6L per NC.
STAT consult to cardiology placed, perfect serve message sent URGENT to Dr Ashanti Monge.
VENTRICULAR CARDIAC PACEMAKER INSERTION  2017       Allergies:  Patient has no known allergies. Family Goal  Comfort care    Meeting held with The patient wife Shelbi Siu received and medical record reviewed. The patient has a recent history of being treated in the ED at [facility] for constipation and sent home. Then also recently in [facility] for sepsis and pneumonia. He was sent to [facility] rehab for a week and the wife felt comfortable taking him home. He was only home 1-2 days and she found him unable to respond with weakness on right side, unable to verbalize and EMS was called. He was admitted to ED unresponsive with facial droop and placed on BIPAP. Today after talking with physician, she decided to withdraw aggressive care, BIPAP has been removed and patient was placed on a Morphine drip at 1 mg hour. I met with the wife to discuss the hospice benefit. I have explained the hospice benefit and philosophy of comfort care with no further aggressive treatment. I explained the different options for hospice care, such as home hospice, Hospice house, and nursing home with hospice, Transition program at the hospice house and private pay. The wife would like to think about the options and have HOTV follow up tomorrow with a visit. I explained that her  would have to reevaluated for the hospice house tomorrow and she is in agreement. The patient is currently unresponsive. When I do place my hand in his left hand, he does squeeze onto my hand. He does not open his eyes. His respirations are moist and 24 per minute. He has no movement  of his right hand. I did discuss with the wife that he may be moved to a private room later today and HOTV will follow up tomorrow. HOTV PLAN  1. The patient is not under the care of [facility] at this time  2. HOTV liaison will follow up tomorrow with the wife      Discharge Plan:  Discharge Disposition;    Facility

## 2019-07-17 NOTE — DISCHARGE SUMMARY
Hospital Medicine Discharge Summary    Patient ID: Lexii Pappas      Patient's PCP: Jeanmarie Chavez MD    Admit Date: 7/15/2019     Discharge Date:  7/17/2019      Admitting Physician: Yady Gaviria MD     Discharge Physician: Britt Carpenter DO      Condition at discharge: Stable    Disposition: [facility]    Discharge Diagnoses: Active Hospital Problems    Diagnosis Date Noted    Hypotension [I95.9]     Palliative care encounter [Z51.5]     Goals of care, counseling/discussion [Z71.89]     Ischemic stroke ([facility] [I63.9] 07/15/2019    Received intravenous tissue plasminogen activator (tPA) in emergency department [Z92.82] 07/15/2019    Stenosis of carotid artery [I65.29] 07/15/2019    Left subclavian artery occlusion [I70.8] 07/15/2019    Acute respiratory failure with hypoxia (HCC) [J96.01] 07/15/2019    PNA (pneumonia) [J18.9] 07/15/2019    ILD (interstitial lung disease) ([facility] [J84.9] 07/15/2019    HAIDER (acute kidney injury) ([facility] [N17.9] 07/15/2019    Hyponatremia [E87.1] 07/15/2019    Anemia [D64.9] 07/15/2019    Reactive thrombocytosis [R79.89] 07/15/2019    QT prolongation [R94.31] 07/15/2019    Septic shock ([facility] [A41.9, R65.21] 07/15/2019    Coronary artery disease involving native coronary artery [I25.10] 07/15/2019    HLD (hyperlipidemia) [E78.5] 07/15/2019    Essential hypertension [I10] 07/15/2019    Cardiac pacemaker in situ [Z95.0] 07/15/2019    Hematuria [R31.9] 07/15/2019    Acute on chronic systolic congestive heart failure ([facility] [I50.23] 07/15/2019       The patient was seen and examined on day of discharge and this discharge summary is in conjunction with any daily progress note from day of discharge. Admission HPI: 80 y.o. male who presented to [facility] with past medical history of coronary artery disease status post stents x1, 2 years ago, hypertension, hyperlipidemia, pacemaker ? Reason. Patient has not been feeling well since June.   He had

## 2019-07-21 LAB
BLOOD CULTURE, ROUTINE: NORMAL
CULTURE, BLOOD 2: NORMAL

## 2025-05-15 NOTE — PROGRESS NOTES
PHYSICAL THERAPY TREATMENT NOTE      6/28/2019  [id number]                      Ellen Mccoy   [id number]                              8/21/1931    Patient Active Problem List   Diagnosis    Sepsis secondary to CAP ([facility]    CAP (community acquired pneumonia) due to Chlamydia species       Recommendation for discharge: subacute rehab  Equipment prescriptions needed: to be determined     AM-[facility] Mobility Inpatient   How much difficulty turning over in bed?: A Little  How much difficulty sitting down on / standing up from a chair with arms?: A Little  How much difficulty moving from lying on back to sitting on side of bed?: A Lot  How much help from another person moving to and from a bed to a chair?: A Little  How much help from another person needed to walk in hospital room?: A Little  How much help from another person for climbing 3-5 steps with a railing?: A Lot  AM-PAC Inpatient Mobility Raw Score : 16  AM-PAC Inpatient T-Scale Score : 40.78  Mobility Inpatient CMS 0-100% Score: 54.16  Mobility Inpatient CMS G-Code Modifier : CK      Precautions: falls and alarm, 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 Patient is agreeable to treatment. Family was present and supportive. Pain status: (measured on a visual analog scale with 0=no pain and 10=excruciating pain) 0/10. O: Pt was instructed in and performed the following:   Bed Mobility- Supine to sit- Moderate assistance      Scooting- Minimal assistance    Sit to supine- Not assessed   Transfers-sit to stand- Minimal assistance with cues needed for correct hand placement   Gait: Patient ambulated 40 feet x 2 using Wheeled Walker with Minimal assistance. Comments: V/C were needed for safety, upright posture, walker management, decreased space from walker, increased MARYANN, and obstacle negotiation.    Steps: Not assessed  Treatment: Pt was educated and facilitated on techniques to increase safety and independence with bed HTN (hypertension)